# Patient Record
Sex: MALE | Race: WHITE | Employment: FULL TIME | URBAN - METROPOLITAN AREA
[De-identification: names, ages, dates, MRNs, and addresses within clinical notes are randomized per-mention and may not be internally consistent; named-entity substitution may affect disease eponyms.]

---

## 2018-01-02 ENCOUNTER — OFFICE VISIT (OUTPATIENT)
Dept: FAMILY MEDICINE CLINIC | Facility: CLINIC | Age: 33
End: 2018-01-02

## 2018-01-02 VITALS
TEMPERATURE: 98 F | SYSTOLIC BLOOD PRESSURE: 122 MMHG | OXYGEN SATURATION: 99 % | HEART RATE: 68 BPM | WEIGHT: 165 LBS | BODY MASS INDEX: 23.62 KG/M2 | DIASTOLIC BLOOD PRESSURE: 70 MMHG | HEIGHT: 70 IN | RESPIRATION RATE: 22 BRPM

## 2018-01-02 DIAGNOSIS — F90.8 ATTENTION DEFICIT HYPERACTIVITY DISORDER (ADHD), OTHER TYPE: ICD-10-CM

## 2018-01-02 DIAGNOSIS — Z85.528 HISTORY OF WILMS' TUMOR: ICD-10-CM

## 2018-01-02 DIAGNOSIS — Z90.5 HISTORY OF NEPHRECTOMY: ICD-10-CM

## 2018-01-02 DIAGNOSIS — Z00.00 ANNUAL PHYSICAL EXAM: Primary | ICD-10-CM

## 2018-01-02 DIAGNOSIS — R41.3 MEMORY LOSS: ICD-10-CM

## 2018-01-02 PROCEDURE — 90686 IIV4 VACC NO PRSV 0.5 ML IM: CPT | Performed by: FAMILY MEDICINE

## 2018-01-02 PROCEDURE — 99213 OFFICE O/P EST LOW 20 MIN: CPT | Performed by: FAMILY MEDICINE

## 2018-01-02 PROCEDURE — 99395 PREV VISIT EST AGE 18-39: CPT | Performed by: FAMILY MEDICINE

## 2018-01-02 PROCEDURE — 90471 IMMUNIZATION ADMIN: CPT | Performed by: FAMILY MEDICINE

## 2018-01-02 RX ORDER — DEXTROAMPHETAMINE SACCHARATE, AMPHETAMINE ASPARTATE MONOHYDRATE, DEXTROAMPHETAMINE SULFATE AND AMPHETAMINE SULFATE 5; 5; 5; 5 MG/1; MG/1; MG/1; MG/1
20 CAPSULE, EXTENDED RELEASE ORAL EVERY MORNING
Qty: 30 CAPSULE | Refills: 0 | Status: SHIPPED | OUTPATIENT
Start: 2018-01-02 | End: 2018-01-29

## 2018-01-02 NOTE — PROGRESS NOTES
Caryn Taylor is a 28year old male who presents for a complete physical exam.   HPI:   Pt complains of memory loss and urinary urgency. 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 Pt never discussed this with neuro.     Pt reports memory issues denies nasal congestion, sinus pain or ST  LUNGS: denies shortness of breath with exertion  CARDIOVASCULAR: denies chest pain on exertion  GI: denies abdominal pain,denies heartburn  : denies nocturia or changes in stream  MUSCULOSKELETAL: denies back pa capsule (20 mg total) by mouth every morning. Dispense: 30 capsule; Refill: 0      Discussed diet exercise calcium vit d and fish oil. Order for fasting labs. The patient indicates understanding of these issues and agrees to the plan.     RTC in 1 mo or

## 2018-01-03 ENCOUNTER — TELEPHONE (OUTPATIENT)
Dept: FAMILY MEDICINE CLINIC | Facility: CLINIC | Age: 33
End: 2018-01-03

## 2018-01-04 NOTE — TELEPHONE ENCOUNTER
Prior auth done thru CoverMyMeds. Approval received for dates 12/5/17-01/03/21. Pharmacy informed. Paper placed in triage brown file for future use.

## 2018-01-10 ENCOUNTER — LABORATORY ENCOUNTER (OUTPATIENT)
Dept: LAB | Age: 33
End: 2018-01-10
Attending: FAMILY MEDICINE
Payer: COMMERCIAL

## 2018-01-10 ENCOUNTER — OFFICE VISIT (OUTPATIENT)
Dept: NEUROLOGY | Facility: CLINIC | Age: 33
End: 2018-01-10

## 2018-01-10 VITALS
WEIGHT: 165 LBS | SYSTOLIC BLOOD PRESSURE: 110 MMHG | DIASTOLIC BLOOD PRESSURE: 80 MMHG | BODY MASS INDEX: 24 KG/M2 | HEART RATE: 64 BPM

## 2018-01-10 DIAGNOSIS — R41.3 MEMORY LOSS: Primary | ICD-10-CM

## 2018-01-10 DIAGNOSIS — Z00.00 ANNUAL PHYSICAL EXAM: ICD-10-CM

## 2018-01-10 DIAGNOSIS — F39 MOOD DISORDER (HCC): ICD-10-CM

## 2018-01-10 LAB
25-HYDROXYVITAMIN D (TOTAL): 36.7 NG/ML (ref 30–100)
ALBUMIN SERPL-MCNC: 4 G/DL (ref 3.5–4.8)
ALP LIVER SERPL-CCNC: 96 U/L (ref 45–117)
ALT SERPL-CCNC: 33 U/L (ref 17–63)
AST SERPL-CCNC: 23 U/L (ref 15–41)
BASOPHILS # BLD AUTO: 0.05 X10(3) UL (ref 0–0.1)
BASOPHILS NFR BLD AUTO: 0.7 %
BILIRUB SERPL-MCNC: 0.5 MG/DL (ref 0.1–2)
BUN BLD-MCNC: 19 MG/DL (ref 8–20)
CALCIUM BLD-MCNC: 9.3 MG/DL (ref 8.3–10.3)
CHLORIDE: 105 MMOL/L (ref 101–111)
CHOLEST SMN-MCNC: 188 MG/DL (ref ?–200)
CO2: 31 MMOL/L (ref 22–32)
CREAT BLD-MCNC: 1.26 MG/DL (ref 0.7–1.3)
EOSINOPHIL # BLD AUTO: 0.14 X10(3) UL (ref 0–0.3)
EOSINOPHIL NFR BLD AUTO: 2 %
ERYTHROCYTE [DISTWIDTH] IN BLOOD BY AUTOMATED COUNT: 13.1 % (ref 11.5–16)
FREE T4: 1.1 NG/DL (ref 0.9–1.8)
GLUCOSE BLD-MCNC: 84 MG/DL (ref 70–99)
HAV AB SERPL IA-ACNC: 1132 PG/ML (ref 193–986)
HCT VFR BLD AUTO: 44.1 % (ref 37–53)
HDLC SERPL-MCNC: 47 MG/DL (ref 45–?)
HDLC SERPL: 4 {RATIO} (ref ?–4.97)
HGB BLD-MCNC: 14.9 G/DL (ref 13–17)
IMMATURE GRANULOCYTE COUNT: 0.01 X10(3) UL (ref 0–1)
IMMATURE GRANULOCYTE RATIO %: 0.1 %
LDLC SERPL CALC-MCNC: 129 MG/DL (ref ?–130)
LYMPHOCYTES # BLD AUTO: 3.15 X10(3) UL (ref 0.9–4)
LYMPHOCYTES NFR BLD AUTO: 46.1 %
M PROTEIN MFR SERPL ELPH: 7.9 G/DL (ref 6.1–8.3)
MCH RBC QN AUTO: 29 PG (ref 27–33.2)
MCHC RBC AUTO-ENTMCNC: 33.8 G/DL (ref 31–37)
MCV RBC AUTO: 85.8 FL (ref 80–99)
MONOCYTES # BLD AUTO: 0.75 X10(3) UL (ref 0.1–0.6)
MONOCYTES NFR BLD AUTO: 11 %
NEUTROPHIL ABS PRELIM: 2.73 X10 (3) UL (ref 1.3–6.7)
NEUTROPHILS # BLD AUTO: 2.73 X10(3) UL (ref 1.3–6.7)
NEUTROPHILS NFR BLD AUTO: 40.1 %
NONHDLC SERPL-MCNC: 141 MG/DL (ref ?–130)
PLATELET # BLD AUTO: 291 10(3)UL (ref 150–450)
POTASSIUM SERPL-SCNC: 4.9 MMOL/L (ref 3.6–5.1)
RBC # BLD AUTO: 5.14 X10(6)UL (ref 4.3–5.7)
RED CELL DISTRIBUTION WIDTH-SD: 40.6 FL (ref 35.1–46.3)
SODIUM SERPL-SCNC: 140 MMOL/L (ref 136–144)
TRIGL SERPL-MCNC: 58 MG/DL (ref ?–150)
TSI SER-ACNC: 3.73 MIU/ML (ref 0.35–5.5)
VLDLC SERPL CALC-MCNC: 12 MG/DL (ref 5–40)
WBC # BLD AUTO: 6.8 X10(3) UL (ref 4–13)

## 2018-01-10 PROCEDURE — 84439 ASSAY OF FREE THYROXINE: CPT

## 2018-01-10 PROCEDURE — 80053 COMPREHEN METABOLIC PANEL: CPT

## 2018-01-10 PROCEDURE — 80061 LIPID PANEL: CPT

## 2018-01-10 PROCEDURE — 99244 OFF/OP CNSLTJ NEW/EST MOD 40: CPT | Performed by: OTHER

## 2018-01-10 PROCEDURE — 84443 ASSAY THYROID STIM HORMONE: CPT

## 2018-01-10 PROCEDURE — 82607 VITAMIN B-12: CPT

## 2018-01-10 PROCEDURE — 36415 COLL VENOUS BLD VENIPUNCTURE: CPT

## 2018-01-10 PROCEDURE — 82306 VITAMIN D 25 HYDROXY: CPT

## 2018-01-10 PROCEDURE — 85025 COMPLETE CBC W/AUTO DIFF WBC: CPT

## 2018-01-10 NOTE — PATIENT INSTRUCTIONS
Refill policies:    • Allow 2-3 business days for refills; controlled substances may take longer.   • Contact your pharmacy at least 5 days prior to running out of medication and have them send an electronic request or submit request through the [facility] have a procedure or additional testing performed. [facility]) will contact your insurance carrier to obtain pre-certification or prior authorization.     Unfortunately, APRIL has seen an increase in denial of payment even though the p

## 2018-01-10 NOTE — PROGRESS NOTES
[facility] OUTPATIENT NEUROLOGY CONSULTATION    Date of consult: 1/10/2018    CC: memory change    HPI: Constance Davis is a 28year old male with past medical history as listed below presents here for initial evaluation of memory loss for the past 1-2 years, wife Medical History:   Diagnosis Date   • History of Wilms' tumor      Past Surgical History:  No date: LAPAROSCOPY, SURGICAL; NEPHRECTOMY  No date: REPAIR ROTATOR CUFF,ACUTE  5/25/17: VASECTOMY      Comment: Dr. Deep Carballo History:     Smoking status: Never regarding the adverse and side effects of the medications. RTC after tests    Patient was told to contact office if there are any side effects from medication or other concerns.     Zahida Iraheta MD   Neurology  [facility]  1

## 2018-01-22 ENCOUNTER — HOSPITAL ENCOUNTER (OUTPATIENT)
Dept: MRI IMAGING | Facility: HOSPITAL | Age: 33
Discharge: HOME | End: 2018-01-22
Attending: Other
Payer: COMMERCIAL

## 2018-01-22 DIAGNOSIS — R41.3 MEMORY LOSS: ICD-10-CM

## 2018-01-22 DIAGNOSIS — F39 MOOD DISORDER (HCC): ICD-10-CM

## 2018-01-22 PROCEDURE — 70551 MRI BRAIN STEM W/O DYE: CPT | Performed by: OTHER

## 2018-01-23 ENCOUNTER — PATIENT MESSAGE (OUTPATIENT)
Dept: NEUROLOGY | Facility: CLINIC | Age: 33
End: 2018-01-23

## 2018-01-24 NOTE — TELEPHONE ENCOUNTER
From: Nadira Arevalo  To: Haim Jaquez MD  Sent: 1/23/2018 6:07 PM CST  Subject: Test Results Question    I have a question about MRI BRAIN (WITHOUT CONTRAST) (CPT=70551) resulted on 1/22/18 at 5:04 PM.    Could there be anything else that may explain m

## 2018-01-24 NOTE — TELEPHONE ENCOUNTER
These hyperintensities are non specific and commonly seen in different age group.  It can represent many etiologies including small vessel disease, migraine related or demyelinating disease, I recommend a follow up visit to discuss in person thoroughly inst

## 2018-01-29 NOTE — PROGRESS NOTES
Luis A Andrade is a 28year old male who presents for ADD f/u    HPI:     Pt reports memory issues for as long as he can remember.   Pt feels the ADD meds have helped/ wife unsure     Improved concentration   Improved frustration / wife unsure  Less distrac ST  LUNGS: denies shortness of breath with exertion  CARDIOVASCULAR: denies chest pain on exertion  GI: denies abdominal pain,denies heartburn  : denies nocturia or changes in stream  MUSCULOSKELETAL: denies back pain  NEURO: denies headaches  PSYCHE: de

## 2018-02-05 ENCOUNTER — APPOINTMENT (OUTPATIENT)
Dept: LAB | Age: 33
End: 2018-02-05
Attending: FAMILY MEDICINE
Payer: COMMERCIAL

## 2018-02-05 DIAGNOSIS — R93.89 ABNORMAL MRI: ICD-10-CM

## 2018-02-05 LAB
ANA SCREEN: NEGATIVE
C-REACTIVE PROTEIN: <0.29 MG/DL (ref ?–1)
RHEUMATOID FACT SERPL-ACNC: <10 IU/ML (ref ?–15)
SED RATE-ML: 5 MM/HR (ref 0–12)

## 2018-02-05 PROCEDURE — 36415 COLL VENOUS BLD VENIPUNCTURE: CPT | Performed by: FAMILY MEDICINE

## 2018-02-05 PROCEDURE — 86617 LYME DISEASE ANTIBODY: CPT | Performed by: FAMILY MEDICINE

## 2018-02-05 PROCEDURE — 86431 RHEUMATOID FACTOR QUANT: CPT | Performed by: FAMILY MEDICINE

## 2018-02-05 PROCEDURE — 85652 RBC SED RATE AUTOMATED: CPT | Performed by: FAMILY MEDICINE

## 2018-02-05 PROCEDURE — 86140 C-REACTIVE PROTEIN: CPT | Performed by: FAMILY MEDICINE

## 2018-02-05 PROCEDURE — 86038 ANTINUCLEAR ANTIBODIES: CPT | Performed by: FAMILY MEDICINE

## 2018-02-08 LAB
B. BURGDORFERI AB, IGM BY WB: POSITIVE
B. BURGDORFERI, IGG WB: NEGATIVE

## 2018-02-09 ENCOUNTER — NURSE ONLY (OUTPATIENT)
Dept: NEUROLOGY | Facility: CLINIC | Age: 33
End: 2018-02-09

## 2018-02-09 DIAGNOSIS — F39 MOOD DISORDER (HCC): ICD-10-CM

## 2018-02-09 DIAGNOSIS — F41.9 ANXIETY: ICD-10-CM

## 2018-02-09 DIAGNOSIS — F90.8 ATTENTION DEFICIT HYPERACTIVITY DISORDER (ADHD), OTHER TYPE: ICD-10-CM

## 2018-02-09 DIAGNOSIS — R41.3 MEMORY LOSS: ICD-10-CM

## 2018-02-09 PROCEDURE — 95816 EEG AWAKE AND DROWSY: CPT | Performed by: OTHER

## 2018-02-09 NOTE — PROCEDURES
Date of Procedure: 2/9/2018    Procedure: EEG (ELECTROENCEPHALOGRAM)     DX:  MEMORY LOSS, DIFFICULTY FOCUSING/ CONCENTRATING  HX:  PT IS A 34 Y/O MALE PRESENT FOR EVALUATION OF MEMORY LOSS FOR THE PAST FEW YEARS.   PER REPORT PT HAS TROUBLE ITH SIMPLE 1ST

## 2018-02-20 ENCOUNTER — TELEPHONE (OUTPATIENT)
Dept: FAMILY MEDICINE CLINIC | Facility: CLINIC | Age: 33
End: 2018-02-20

## 2018-02-26 ENCOUNTER — TELEPHONE (OUTPATIENT)
Dept: NEUROLOGY | Facility: CLINIC | Age: 33
End: 2018-02-26

## 2018-02-26 NOTE — TELEPHONE ENCOUNTER
Pt calling for EEG results for test done on 2/9/18. Per EEG report:    IMPRESSION: This is a normal awake and drowsy EEG. However, this does not rule out seizure disorder. Clinical correlation is advised.     Will route to Dr. Tate Brooks for any further recom

## 2018-03-06 ENCOUNTER — TELEPHONE (OUTPATIENT)
Dept: NEUROLOGY | Facility: CLINIC | Age: 33
End: 2018-03-06

## 2018-03-07 DIAGNOSIS — F90.8 ATTENTION DEFICIT HYPERACTIVITY DISORDER (ADHD), OTHER TYPE: ICD-10-CM

## 2018-03-07 NOTE — TELEPHONE ENCOUNTER
Left message for patient- Dr. Annette Dunlap wanted to see him back in a month. Unable to leave message- mailbox full.

## 2018-03-07 NOTE — TELEPHONE ENCOUNTER
Patient called and stated pharmacy told him he needed a PA for his Adderall. An encounter on 1-3-18 shows approval for the drug is complete from 12-+5-17 to 1-3-2021    Can we refill his scripts. He will be the one picking up with ID    Please advise.

## 2018-03-08 RX ORDER — DEXTROAMPHETAMINE SACCHARATE, AMPHETAMINE ASPARTATE MONOHYDRATE, DEXTROAMPHETAMINE SULFATE AND AMPHETAMINE SULFATE 6.25; 6.25; 6.25; 6.25 MG/1; MG/1; MG/1; MG/1
25 CAPSULE, EXTENDED RELEASE ORAL EVERY MORNING
Qty: 30 CAPSULE | Refills: 0 | Status: SHIPPED | OUTPATIENT
Start: 2018-03-08 | End: 2019-02-11

## 2018-04-03 ENCOUNTER — APPOINTMENT (OUTPATIENT)
Dept: LAB | Age: 33
End: 2018-04-03
Attending: FAMILY MEDICINE
Payer: COMMERCIAL

## 2018-04-03 ENCOUNTER — OFFICE VISIT (OUTPATIENT)
Dept: FAMILY MEDICINE CLINIC | Facility: CLINIC | Age: 33
End: 2018-04-03

## 2018-04-03 VITALS
BODY MASS INDEX: 23.19 KG/M2 | TEMPERATURE: 99 F | HEIGHT: 70 IN | WEIGHT: 162 LBS | RESPIRATION RATE: 20 BRPM | SYSTOLIC BLOOD PRESSURE: 126 MMHG | DIASTOLIC BLOOD PRESSURE: 78 MMHG | OXYGEN SATURATION: 98 % | HEART RATE: 72 BPM

## 2018-04-03 DIAGNOSIS — F98.8 ATTENTION DEFICIT DISORDER (ADD) WITHOUT HYPERACTIVITY: ICD-10-CM

## 2018-04-03 DIAGNOSIS — R90.89 ABNORMAL BRAIN MRI: ICD-10-CM

## 2018-04-03 DIAGNOSIS — R90.89 ABNORMAL BRAIN MRI: Primary | ICD-10-CM

## 2018-04-03 PROCEDURE — 36415 COLL VENOUS BLD VENIPUNCTURE: CPT | Performed by: FAMILY MEDICINE

## 2018-04-03 PROCEDURE — 99214 OFFICE O/P EST MOD 30 MIN: CPT | Performed by: FAMILY MEDICINE

## 2018-04-03 PROCEDURE — 86617 LYME DISEASE ANTIBODY: CPT | Performed by: FAMILY MEDICINE

## 2018-04-03 PROCEDURE — 87476 LYME DIS DNA AMP PROBE: CPT | Performed by: FAMILY MEDICINE

## 2018-04-03 RX ORDER — DEXTROAMPHETAMINE SACCHARATE, AMPHETAMINE ASPARTATE MONOHYDRATE, DEXTROAMPHETAMINE SULFATE AND AMPHETAMINE SULFATE 6.25; 6.25; 6.25; 6.25 MG/1; MG/1; MG/1; MG/1
25 CAPSULE, EXTENDED RELEASE ORAL DAILY
Qty: 30 CAPSULE | Refills: 0 | Status: SHIPPED | OUTPATIENT
Start: 2018-05-03 | End: 2018-04-09

## 2018-04-03 RX ORDER — DEXTROAMPHETAMINE SACCHARATE, AMPHETAMINE ASPARTATE MONOHYDRATE, DEXTROAMPHETAMINE SULFATE AND AMPHETAMINE SULFATE 6.25; 6.25; 6.25; 6.25 MG/1; MG/1; MG/1; MG/1
25 CAPSULE, EXTENDED RELEASE ORAL DAILY
Qty: 30 CAPSULE | Refills: 0 | Status: SHIPPED | OUTPATIENT
Start: 2018-06-02 | End: 2018-04-09

## 2018-04-03 RX ORDER — DEXTROAMPHETAMINE SACCHARATE, AMPHETAMINE ASPARTATE MONOHYDRATE, DEXTROAMPHETAMINE SULFATE AND AMPHETAMINE SULFATE 6.25; 6.25; 6.25; 6.25 MG/1; MG/1; MG/1; MG/1
25 CAPSULE, EXTENDED RELEASE ORAL DAILY
Qty: 30 CAPSULE | Refills: 0 | Status: SHIPPED | OUTPATIENT
Start: 2018-04-03 | End: 2018-04-09

## 2018-04-03 NOTE — PROGRESS NOTES
Charissa Cabezas is a 35year old male who presents for ADD    HPI:     Pt reports memory issues for as long as he can remember.   Pt feels the ADD meds have helped; the higher dose (25mg) has been better    Improved concentration   Improved frustration / wif heartburn  : denies nocturia or changes in stream  MUSCULOSKELETAL: denies back pain  NEURO: denies headaches  PSYCHE: denies depression or anxiety  HEMATOLOGIC: denies hx of anemia  ENDOCRINE: denies thyroid history  ALL/ASTHMA: denies hx of allergy or

## 2018-04-06 ENCOUNTER — OFFICE VISIT (OUTPATIENT)
Dept: NEUROLOGY | Facility: CLINIC | Age: 33
End: 2018-04-06

## 2018-04-06 VITALS
WEIGHT: 161 LBS | BODY MASS INDEX: 23 KG/M2 | DIASTOLIC BLOOD PRESSURE: 80 MMHG | SYSTOLIC BLOOD PRESSURE: 122 MMHG | RESPIRATION RATE: 16 BRPM | HEART RATE: 76 BPM

## 2018-04-06 DIAGNOSIS — A69.20 LYME DISEASE: Primary | ICD-10-CM

## 2018-04-06 PROCEDURE — 99215 OFFICE O/P EST HI 40 MIN: CPT | Performed by: OTHER

## 2018-04-06 NOTE — PATIENT INSTRUCTIONS
Refill policies:    • Allow 2-3 business days for refills; controlled substances may take longer.   • Contact your pharmacy at least 5 days prior to running out of medication and have them send an electronic request or submit request through the [facility] for the entire amount billed. Precertification and Prior Authorizations  If your physician has recommended that you have a procedure or additional testing performed.   [facility] ([facility]) will contact your insurance carrier to obtain pr

## 2018-04-06 NOTE — PROGRESS NOTES
[facility] Neurology outpatient progress note  Date of service: 4/6/2018    Patient here for a follow-up visit for memory change. Since last visit he seems improved, pt and wife are happy with it. Here to review MRI, neuropsych test and EEG, labs.   Karina Russ •  Amphetamine-Dextroamphet ER (ADDERALL XR) 25 MG Oral Capsule SR 24 Hr, Take 1 capsule (25 mg total) by mouth daily. , Disp: 30 capsule, Rfl: 0  •  [START ON 5/3/2018] Amphetamine-Dextroamphet ER (ADDERALL XR) 25 MG Oral Capsule SR 24 Hr, Take 1 capsule ( Tone: normal  DTRs: 2+ symmetric  Plantar response: bilateral flexor  Coordination: Normal FTN  Sensory: symmetric to PP and LT  Gait: nl  Romberg: nl  Neck: supple    Test reviewed on 4/6/2018    A/P:   (R41.3) Memory loss  (primary encounter diagnosis):

## 2018-04-09 ENCOUNTER — OFFICE VISIT (OUTPATIENT)
Dept: FAMILY MEDICINE CLINIC | Facility: CLINIC | Age: 33
End: 2018-04-09

## 2018-04-09 VITALS
SYSTOLIC BLOOD PRESSURE: 118 MMHG | HEART RATE: 90 BPM | BODY MASS INDEX: 22.9 KG/M2 | WEIGHT: 160 LBS | HEIGHT: 70 IN | RESPIRATION RATE: 16 BRPM | DIASTOLIC BLOOD PRESSURE: 84 MMHG | TEMPERATURE: 99 F

## 2018-04-09 DIAGNOSIS — L50.9 URTICARIA: Primary | ICD-10-CM

## 2018-04-09 DIAGNOSIS — R76.8 POSITIVE LYME DISEASE SEROLOGY: ICD-10-CM

## 2018-04-09 PROCEDURE — 99214 OFFICE O/P EST MOD 30 MIN: CPT | Performed by: PHYSICIAN ASSISTANT

## 2018-04-09 RX ORDER — PREDNISONE 20 MG/1
TABLET ORAL
Qty: 10 TABLET | Refills: 0 | Status: SHIPPED | OUTPATIENT
Start: 2018-04-09 | End: 2018-04-11 | Stop reason: ALTCHOICE

## 2018-04-09 RX ORDER — DOXYCYCLINE HYCLATE 100 MG
100 TABLET ORAL 2 TIMES DAILY
Qty: 56 TABLET | Refills: 0 | Status: SHIPPED | OUTPATIENT
Start: 2018-04-09 | End: 2018-04-11 | Stop reason: ALTCHOICE

## 2018-04-09 NOTE — PROGRESS NOTES
HPI:    Patient ID: Yara Cuevas is a 35year old male. HPI  Pt presents to clinic with rash. Pt recently diagnosed with lyme disease. Had labs in February and April.  Had MRI which showed \"CONCLUSION: There are few scattered T2/FLAIR hyperintensit Disp:  Rfl:      Allergies:No Known Allergies   PHYSICAL EXAM:   Physical Exam   Constitutional: He appears well-developed and well-nourished. No distress. HENT:   Head: Normocephalic and atraumatic.    Right Ear: External ear normal.   Left Ear: External worsens or spreads or if chest pain or SOB occur. 30 minutes spent with the patient, greater than 50% of the time spent counseling and coordinating care. Pt's questions answered to satisfaction. He verbalized understanding and is in agreement with plan.

## 2018-04-11 ENCOUNTER — OFFICE VISIT (OUTPATIENT)
Dept: FAMILY MEDICINE CLINIC | Facility: CLINIC | Age: 33
End: 2018-04-11

## 2018-04-11 VITALS
SYSTOLIC BLOOD PRESSURE: 138 MMHG | HEART RATE: 85 BPM | HEIGHT: 70 IN | BODY MASS INDEX: 22.62 KG/M2 | DIASTOLIC BLOOD PRESSURE: 80 MMHG | RESPIRATION RATE: 16 BRPM | TEMPERATURE: 99 F | OXYGEN SATURATION: 97 % | WEIGHT: 158 LBS

## 2018-04-11 DIAGNOSIS — L50.9 URTICARIA: Primary | ICD-10-CM

## 2018-04-11 DIAGNOSIS — R76.8 POSITIVE LYME DISEASE SEROLOGY: ICD-10-CM

## 2018-04-11 PROCEDURE — 99213 OFFICE O/P EST LOW 20 MIN: CPT | Performed by: PHYSICIAN ASSISTANT

## 2018-04-11 NOTE — PROGRESS NOTES
HPI:    Patient ID: Christina Parmar is a 35year old male. HPI  Pt presents to clinic to follow up on rash. Seen two days ago. Was prescribed prednisone but did not take. He took benadryl and his rash resolved. He also did not start the doxycycline.  D Pt states he has follow up with Dr Mk Pack in a couple of months. Follow up as needed  Pt's questions answered to satisfaction. He verbalized understanding and is in agreement with plan.         LEA#[id number]

## 2018-04-17 ENCOUNTER — TELEPHONE (OUTPATIENT)
Dept: FAMILY MEDICINE CLINIC | Facility: CLINIC | Age: 33
End: 2018-04-17

## 2018-04-18 ENCOUNTER — TELEPHONE (OUTPATIENT)
Dept: FAMILY MEDICINE CLINIC | Facility: CLINIC | Age: 33
End: 2018-04-18

## 2018-04-18 NOTE — TELEPHONE ENCOUNTER
Patient states his rash is back. He wants to know if he should start the steroid or what the next step is.

## 2018-04-18 NOTE — TELEPHONE ENCOUNTER
Patient has been in twice to see us for a rash. The rash was down patients sides but now is all over the front of him. Rash is still really itchy. What should he do.

## 2018-04-18 NOTE — TELEPHONE ENCOUNTER
Spoke to pt states he has been seen a couple times for rash pt states he did not  prednisone from pharmacy which was prescribed at 4/9/18 visit. pt states he also has not taken Benadryl.  Pt states rash is not worse it just keeps coming back and is it

## 2018-04-23 ENCOUNTER — LAB ENCOUNTER (OUTPATIENT)
Dept: LAB | Age: 33
End: 2018-04-23
Attending: PHYSICIAN ASSISTANT
Payer: COMMERCIAL

## 2018-04-23 ENCOUNTER — OFFICE VISIT (OUTPATIENT)
Dept: FAMILY MEDICINE CLINIC | Facility: CLINIC | Age: 33
End: 2018-04-23

## 2018-04-23 VITALS
HEART RATE: 68 BPM | TEMPERATURE: 97 F | HEIGHT: 70 IN | OXYGEN SATURATION: 98 % | BODY MASS INDEX: 22.62 KG/M2 | DIASTOLIC BLOOD PRESSURE: 72 MMHG | RESPIRATION RATE: 16 BRPM | WEIGHT: 158 LBS | SYSTOLIC BLOOD PRESSURE: 118 MMHG

## 2018-04-23 DIAGNOSIS — L50.9 URTICARIA: Primary | ICD-10-CM

## 2018-04-23 DIAGNOSIS — L50.9 URTICARIA: ICD-10-CM

## 2018-04-23 PROCEDURE — 86003 ALLG SPEC IGE CRUDE XTRC EA: CPT | Performed by: PHYSICIAN ASSISTANT

## 2018-04-23 PROCEDURE — 85652 RBC SED RATE AUTOMATED: CPT | Performed by: PHYSICIAN ASSISTANT

## 2018-04-23 PROCEDURE — 80053 COMPREHEN METABOLIC PANEL: CPT | Performed by: PHYSICIAN ASSISTANT

## 2018-04-23 PROCEDURE — 82785 ASSAY OF IGE: CPT | Performed by: PHYSICIAN ASSISTANT

## 2018-04-23 PROCEDURE — 86140 C-REACTIVE PROTEIN: CPT | Performed by: PHYSICIAN ASSISTANT

## 2018-04-23 PROCEDURE — 36415 COLL VENOUS BLD VENIPUNCTURE: CPT | Performed by: PHYSICIAN ASSISTANT

## 2018-04-23 PROCEDURE — 85025 COMPLETE CBC W/AUTO DIFF WBC: CPT | Performed by: PHYSICIAN ASSISTANT

## 2018-04-23 PROCEDURE — 99213 OFFICE O/P EST LOW 20 MIN: CPT | Performed by: PHYSICIAN ASSISTANT

## 2018-04-23 NOTE — PROGRESS NOTES
HPI:    Patient ID: Jana Serrato is a 35year old male. HPI  Pt presents to clinic to follow up on rash. Initially seen on 4/9/18. He took only benadryl at that time and the rash had resolved at his follow up on 4/11/18.  Rash returned on 4/17/18 an clear and moist.   Airway patent   Neck: Neck supple. No tracheal deviation present. Cardiovascular: Normal rate, regular rhythm and normal heart sounds. Pulmonary/Chest: Effort normal and breath sounds normal. He has no wheezes.    Musculoskeletal: No

## 2018-04-26 DIAGNOSIS — E87.6 LOW BLOOD POTASSIUM: Primary | ICD-10-CM

## 2018-04-27 ENCOUNTER — APPOINTMENT (OUTPATIENT)
Dept: LAB | Age: 33
End: 2018-04-27
Attending: PHYSICIAN ASSISTANT
Payer: COMMERCIAL

## 2018-04-27 DIAGNOSIS — E87.6 LOW BLOOD POTASSIUM: ICD-10-CM

## 2018-04-27 PROCEDURE — 36415 COLL VENOUS BLD VENIPUNCTURE: CPT | Performed by: PHYSICIAN ASSISTANT

## 2018-04-27 PROCEDURE — 84132 ASSAY OF SERUM POTASSIUM: CPT | Performed by: PHYSICIAN ASSISTANT

## 2018-07-13 ENCOUNTER — OFFICE VISIT (OUTPATIENT)
Dept: FAMILY MEDICINE CLINIC | Facility: CLINIC | Age: 33
End: 2018-07-13

## 2018-07-13 VITALS
DIASTOLIC BLOOD PRESSURE: 70 MMHG | HEART RATE: 76 BPM | RESPIRATION RATE: 16 BRPM | SYSTOLIC BLOOD PRESSURE: 116 MMHG | WEIGHT: 151 LBS | BODY MASS INDEX: 22 KG/M2

## 2018-07-13 DIAGNOSIS — F90.8 ATTENTION DEFICIT HYPERACTIVITY DISORDER (ADHD), OTHER TYPE: ICD-10-CM

## 2018-07-13 PROCEDURE — 99214 OFFICE O/P EST MOD 30 MIN: CPT | Performed by: FAMILY MEDICINE

## 2018-07-13 RX ORDER — DEXTROAMPHETAMINE SACCHARATE, AMPHETAMINE ASPARTATE MONOHYDRATE, DEXTROAMPHETAMINE SULFATE AND AMPHETAMINE SULFATE 6.25; 6.25; 6.25; 6.25 MG/1; MG/1; MG/1; MG/1
25 CAPSULE, EXTENDED RELEASE ORAL DAILY
Qty: 30 CAPSULE | Refills: 0 | Status: SHIPPED | OUTPATIENT
Start: 2018-07-18 | End: 2018-08-17

## 2018-07-13 RX ORDER — DEXTROAMPHETAMINE SACCHARATE, AMPHETAMINE ASPARTATE MONOHYDRATE, DEXTROAMPHETAMINE SULFATE AND AMPHETAMINE SULFATE 6.25; 6.25; 6.25; 6.25 MG/1; MG/1; MG/1; MG/1
25 CAPSULE, EXTENDED RELEASE ORAL DAILY
Qty: 30 CAPSULE | Refills: 0 | Status: SHIPPED | OUTPATIENT
Start: 2018-08-17 | End: 2018-09-16

## 2018-07-13 RX ORDER — DEXTROAMPHETAMINE SACCHARATE, AMPHETAMINE ASPARTATE MONOHYDRATE, DEXTROAMPHETAMINE SULFATE AND AMPHETAMINE SULFATE 6.25; 6.25; 6.25; 6.25 MG/1; MG/1; MG/1; MG/1
25 CAPSULE, EXTENDED RELEASE ORAL DAILY
Qty: 30 CAPSULE | Refills: 0 | Status: SHIPPED | OUTPATIENT
Start: 2018-09-16 | End: 2018-10-16

## 2018-07-13 NOTE — PROGRESS NOTES
Angi Barnes is a 35year old male who presents for ADD f/u    HPI:     Pt reports memory issues for as long as he can remember.   Pt feels the ADD meds have helped/ wife unsure     Improved concentration   No frustration   Less distracted and forgetfu shortness of breath with exertion  CARDIOVASCULAR: denies chest pain on exertion  GI: denies abdominal pain,denies heartburn  : denies nocturia or changes in stream  MUSCULOSKELETAL: denies back pain  NEURO: denies headaches  PSYCHE: denies depression or Pt denies hopelessness or anhedonia. Pt reports normal sleep and normal appetite. Pt has been seeing neuro   MRI with some non specific changes.   Discussed EEG and labs       Current Outpatient Prescriptions:  [START ON 7/18/2018] Amphetamine-Dextro in stream  MUSCULOSKELETAL: denies back pain  NEURO: denies headaches  PSYCHE: denies depression or anxiety  HEMATOLOGIC: denies hx of anemia  ENDOCRINE: denies thyroid history  ALL/ASTHMA: denies hx of allergy or asthma    EXAM:   /70   Pulse 76   R
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
## 2019-01-07 ENCOUNTER — OFFICE VISIT (OUTPATIENT)
Dept: FAMILY MEDICINE CLINIC | Facility: CLINIC | Age: 34
End: 2019-01-07
Payer: COMMERCIAL

## 2019-01-07 VITALS
HEART RATE: 84 BPM | HEIGHT: 68 IN | RESPIRATION RATE: 18 BRPM | BODY MASS INDEX: 23.27 KG/M2 | SYSTOLIC BLOOD PRESSURE: 118 MMHG | OXYGEN SATURATION: 98 % | TEMPERATURE: 98 F | WEIGHT: 153.5 LBS | DIASTOLIC BLOOD PRESSURE: 80 MMHG

## 2019-01-07 DIAGNOSIS — Z23 NEED FOR VACCINATION: ICD-10-CM

## 2019-01-07 DIAGNOSIS — R45.89 DEPRESSED MOOD: ICD-10-CM

## 2019-01-07 DIAGNOSIS — Z00.00 ANNUAL PHYSICAL EXAM: Primary | ICD-10-CM

## 2019-01-07 DIAGNOSIS — R41.3 MEMORY LOSS: ICD-10-CM

## 2019-01-07 PROCEDURE — 99395 PREV VISIT EST AGE 18-39: CPT | Performed by: FAMILY MEDICINE

## 2019-01-07 PROCEDURE — 90471 IMMUNIZATION ADMIN: CPT | Performed by: FAMILY MEDICINE

## 2019-01-07 PROCEDURE — 90686 IIV4 VACC NO PRSV 0.5 ML IM: CPT | Performed by: FAMILY MEDICINE

## 2019-01-07 NOTE — PROGRESS NOTES
El Shown is a 35year old male who presents for a complete physical exam.   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

## 2019-02-11 DIAGNOSIS — F90.8 ATTENTION DEFICIT HYPERACTIVITY DISORDER (ADHD), OTHER TYPE: ICD-10-CM

## 2019-02-12 RX ORDER — DEXTROAMPHETAMINE SACCHARATE, AMPHETAMINE ASPARTATE MONOHYDRATE, DEXTROAMPHETAMINE SULFATE AND AMPHETAMINE SULFATE 6.25; 6.25; 6.25; 6.25 MG/1; MG/1; MG/1; MG/1
25 CAPSULE, EXTENDED RELEASE ORAL EVERY MORNING
Qty: 30 CAPSULE | Refills: 0 | Status: SHIPPED | OUTPATIENT
Start: 2019-02-12 | End: 2020-10-12

## 2019-02-28 ENCOUNTER — PATIENT MESSAGE (OUTPATIENT)
Dept: FAMILY MEDICINE CLINIC | Facility: CLINIC | Age: 34
End: 2019-02-28

## 2019-02-28 NOTE — TELEPHONE ENCOUNTER
From: Le Bustillos  To: Steffanie Waller DO  Sent: 2/28/2019 8:32 AM CST  Subject: Other    Hello,     I am currently seeing a therapist and his name is Kenna Villalta and he said I should be on anti depressants like Wellbutrin and that's what I made my next appo

## 2019-03-18 NOTE — PROGRESS NOTES
Faina Schilling is a 29year old male who presents for depression     HPI:     Pt feels the ADD meds have helped- pt takes the meds every day     Pt is in couples therapy   Therapist and wife feel he needs medication   + family h/o mood issues  Pt is not congestion, sinus pain or ST  LUNGS: denies shortness of breath with exertion  CARDIOVASCULAR: denies chest pain on exertion  GI: denies abdominal pain,denies heartburn  : denies nocturia or changes in stream  MUSCULOSKELETAL: denies back pain  NEURO: de

## 2019-03-27 RX ORDER — DEXTROAMPHETAMINE SACCHARATE, AMPHETAMINE ASPARTATE MONOHYDRATE, DEXTROAMPHETAMINE SULFATE AND AMPHETAMINE SULFATE 6.25; 6.25; 6.25; 6.25 MG/1; MG/1; MG/1; MG/1
25 CAPSULE, EXTENDED RELEASE ORAL DAILY
Qty: 30 CAPSULE | Refills: 0 | Status: SHIPPED | OUTPATIENT
Start: 2019-05-26 | End: 2019-06-23

## 2019-03-27 RX ORDER — DEXTROAMPHETAMINE SACCHARATE, AMPHETAMINE ASPARTATE MONOHYDRATE, DEXTROAMPHETAMINE SULFATE AND AMPHETAMINE SULFATE 6.25; 6.25; 6.25; 6.25 MG/1; MG/1; MG/1; MG/1
25 CAPSULE, EXTENDED RELEASE ORAL DAILY
Qty: 30 CAPSULE | Refills: 0 | Status: SHIPPED | OUTPATIENT
Start: 2019-03-27 | End: 2019-04-26

## 2019-03-27 RX ORDER — DEXTROAMPHETAMINE SACCHARATE, AMPHETAMINE ASPARTATE MONOHYDRATE, DEXTROAMPHETAMINE SULFATE AND AMPHETAMINE SULFATE 6.25; 6.25; 6.25; 6.25 MG/1; MG/1; MG/1; MG/1
25 CAPSULE, EXTENDED RELEASE ORAL DAILY
Qty: 30 CAPSULE | Refills: 0 | Status: SHIPPED | OUTPATIENT
Start: 2019-04-26 | End: 2019-05-26

## 2019-03-27 NOTE — TELEPHONE ENCOUNTER
Pt thought he had more refills of Adderall. He did not when he went to go fill it. He is completely out so needs medication asap so he does not go without. Guadalupe Riedel [facility] [place] 3 month supply.  Wife Lio Pac will be picking up once

## 2019-04-16 NOTE — PROGRESS NOTES
Yara Cuevas is a 29year old male who presents for depression and ADD  HPI:     Pt feels the ADD meds have helped- pt takes the meds every day and doing well   Pt feels the lexapro is helping; he is less irritable.      Pt is in couples therapy   + fa Disp:  Rfl:    Oxybutynin Chloride ER 5 MG Oral Tablet 24 Hr Take 5 mg by mouth daily.  Disp:  Rfl:       Past Medical History:   Diagnosis Date   • History of Wilms' tumor       Past Surgical History:   Procedure Laterality Date   • LAPAROSCOPY, SURGICAL; plan.  RTC 1 mo or sooner if needed

## 2019-04-22 ENCOUNTER — TELEPHONE (OUTPATIENT)
Dept: FAMILY MEDICINE CLINIC | Facility: CLINIC | Age: 34
End: 2019-04-22

## 2019-04-22 NOTE — TELEPHONE ENCOUNTER
Pt and Dr Lorna Mario Discussed a referral for a Dr That could diagnosis and deals with autism.  Dr Lorna Mario was going to was going to discuss with a co-worker about a referral. This pt is calling to check on the referral.

## 2019-04-23 NOTE — TELEPHONE ENCOUNTER
Reached out to patient and left message requesting callback to discuss options for neuropsych testing to rule out autism spectrum disorder in an adult male.     Received callback and recommended the following:    Earl Irizarry PsyD, [address]

## 2019-05-24 ENCOUNTER — OFFICE VISIT (OUTPATIENT)
Dept: FAMILY MEDICINE CLINIC | Facility: CLINIC | Age: 34
End: 2019-05-24
Payer: COMMERCIAL

## 2019-05-24 VITALS
BODY MASS INDEX: 23.04 KG/M2 | WEIGHT: 152 LBS | HEIGHT: 68 IN | DIASTOLIC BLOOD PRESSURE: 80 MMHG | HEART RATE: 83 BPM | TEMPERATURE: 98 F | OXYGEN SATURATION: 97 % | RESPIRATION RATE: 16 BRPM | SYSTOLIC BLOOD PRESSURE: 126 MMHG

## 2019-05-24 DIAGNOSIS — F32.A ANXIETY AND DEPRESSION: ICD-10-CM

## 2019-05-24 DIAGNOSIS — F41.9 ANXIETY AND DEPRESSION: ICD-10-CM

## 2019-05-24 PROCEDURE — 99214 OFFICE O/P EST MOD 30 MIN: CPT | Performed by: FAMILY MEDICINE

## 2019-05-24 RX ORDER — ESCITALOPRAM OXALATE 20 MG/1
20 TABLET ORAL DAILY
Qty: 90 TABLET | Refills: 0 | Status: SHIPPED | OUTPATIENT
Start: 2019-05-24 | End: 2019-07-08

## 2019-05-24 NOTE — PROGRESS NOTES
Britt Mooney is a 29year old male who presents for depression and ADD  HPI:     Pt feels the ADD meds have helped- pt takes the meds every day and doing well   Pt feels the lexapro is helping, the higher dose is better.   Pt feels more positive     Pt VASECTOMY  5/25/17    Dr. Madiha Alfredo       No family history on file. Social History:  Social History    Tobacco Use      Smoking status: Never Smoker      Smokeless tobacco: Never Used    Alcohol use: Yes      Comment: occassional    Drug use: No     Occ:.  [facility]

## 2019-06-24 RX ORDER — DEXTROAMPHETAMINE SACCHARATE, AMPHETAMINE ASPARTATE MONOHYDRATE, DEXTROAMPHETAMINE SULFATE AND AMPHETAMINE SULFATE 6.25; 6.25; 6.25; 6.25 MG/1; MG/1; MG/1; MG/1
25 CAPSULE, EXTENDED RELEASE ORAL DAILY
Qty: 30 CAPSULE | Refills: 0 | Status: SHIPPED | OUTPATIENT
Start: 2019-06-24 | End: 2019-07-24

## 2019-07-09 NOTE — PROGRESS NOTES
Yara Cuevas is a 29year old male who presents for depression and ADD  HPI:     Pt feels the ADD meds have helped- pt takes the meds every day and doing well   Pt feels the lexapro is helping  Pt feels more positive     Pt is in couples therapy, stil blurred vision or double vision  HEENT: denies nasal congestion, sinus pain or ST  LUNGS: denies shortness of breath with exertion  CARDIOVASCULAR: denies chest pain on exertion  GI: denies abdominal pain,denies heartburn  : denies nocturia or changes in

## 2019-10-03 DIAGNOSIS — F90.8 ATTENTION DEFICIT HYPERACTIVITY DISORDER (ADHD), OTHER TYPE: ICD-10-CM

## 2019-10-04 RX ORDER — DEXTROAMPHETAMINE SACCHARATE, AMPHETAMINE ASPARTATE MONOHYDRATE, DEXTROAMPHETAMINE SULFATE AND AMPHETAMINE SULFATE 6.25; 6.25; 6.25; 6.25 MG/1; MG/1; MG/1; MG/1
25 CAPSULE, EXTENDED RELEASE ORAL DAILY
Qty: 30 CAPSULE | Refills: 0 | Status: SHIPPED | OUTPATIENT
Start: 2019-10-04 | End: 2019-11-03

## 2019-10-04 NOTE — TELEPHONE ENCOUNTER
Rx Request  Amphetamine-Dextroamphet ER (ADDERALL XR) 25 MG Oral Capsule SR 24 Hr    Disp:      30              R: 0    Associated Dx: ADHD    Last Visit: 07/08/2019    Last Refilled: 09/06/2019

## 2019-10-09 NOTE — PROGRESS NOTES
Kenrick Ruiz is a 29year old male who presents for depression and ADD  HPI:     Pt feels the ADD meds working well  Pt feels the Lum Crape is helping  Pt feels more positive     Pt is in couples therapy, still working on things.  Things slowly improving blurred vision or double vision  HEENT: denies nasal congestion, sinus pain or ST  LUNGS: denies shortness of breath with exertion  CARDIOVASCULAR: denies chest pain on exertion  GI: denies abdominal pain,denies heartburn  : denies nocturia or changes in

## 2019-10-19 DIAGNOSIS — R39.15 URINARY URGENCY: ICD-10-CM

## 2019-10-21 RX ORDER — OXYBUTYNIN CHLORIDE 10 MG/1
TABLET, EXTENDED RELEASE ORAL
Qty: 90 TABLET | Refills: 0 | Status: SHIPPED | OUTPATIENT
Start: 2019-10-21 | End: 2020-03-09

## 2020-01-24 ENCOUNTER — TELEPHONE (OUTPATIENT)
Dept: FAMILY MEDICINE CLINIC | Facility: CLINIC | Age: 35
End: 2020-01-24

## 2020-01-24 RX ORDER — DEXTROAMPHETAMINE SACCHARATE, AMPHETAMINE ASPARTATE MONOHYDRATE, DEXTROAMPHETAMINE SULFATE AND AMPHETAMINE SULFATE 6.25; 6.25; 6.25; 6.25 MG/1; MG/1; MG/1; MG/1
25 CAPSULE, EXTENDED RELEASE ORAL DAILY
Qty: 30 CAPSULE | Refills: 0 | Status: SHIPPED | OUTPATIENT
Start: 2020-03-26 | End: 2020-04-25

## 2020-01-24 RX ORDER — DEXTROAMPHETAMINE SACCHARATE, AMPHETAMINE ASPARTATE MONOHYDRATE, DEXTROAMPHETAMINE SULFATE AND AMPHETAMINE SULFATE 6.25; 6.25; 6.25; 6.25 MG/1; MG/1; MG/1; MG/1
25 CAPSULE, EXTENDED RELEASE ORAL DAILY
Qty: 30 CAPSULE | Refills: 0 | Status: SHIPPED | OUTPATIENT
Start: 2020-01-24 | End: 2020-02-23

## 2020-01-24 RX ORDER — DEXTROAMPHETAMINE SACCHARATE, AMPHETAMINE ASPARTATE MONOHYDRATE, DEXTROAMPHETAMINE SULFATE AND AMPHETAMINE SULFATE 6.25; 6.25; 6.25; 6.25 MG/1; MG/1; MG/1; MG/1
25 CAPSULE, EXTENDED RELEASE ORAL DAILY
Qty: 30 CAPSULE | Refills: 0 | Status: SHIPPED | OUTPATIENT
Start: 2020-02-24 | End: 2020-03-26

## 2020-02-21 RX ORDER — DEXTROAMPHETAMINE SACCHARATE, AMPHETAMINE ASPARTATE MONOHYDRATE, DEXTROAMPHETAMINE SULFATE AND AMPHETAMINE SULFATE 6.25; 6.25; 6.25; 6.25 MG/1; MG/1; MG/1; MG/1
25 CAPSULE, EXTENDED RELEASE ORAL DAILY
Qty: 30 CAPSULE | Refills: 0 | OUTPATIENT
Start: 2020-02-21 | End: 2020-03-22

## 2020-03-09 DIAGNOSIS — R39.15 URINARY URGENCY: ICD-10-CM

## 2020-03-09 RX ORDER — OXYBUTYNIN CHLORIDE 10 MG/1
TABLET, EXTENDED RELEASE ORAL
Qty: 90 TABLET | Refills: 0 | Status: SHIPPED | OUTPATIENT
Start: 2020-03-09 | End: 2020-07-18

## 2020-03-09 NOTE — TELEPHONE ENCOUNTER
Rx Request  OXYBUTYNIN CHLORIDE ER 10 MG Oral Tablet 24 Hr    Disp:      90              R: 0    Associated Dx: Urinary urgency    Last Visit: 10/09/2019    Last Refilled: 10/21/2019

## 2020-03-17 ENCOUNTER — OFFICE VISIT (OUTPATIENT)
Dept: FAMILY MEDICINE CLINIC | Facility: CLINIC | Age: 35
End: 2020-03-17
Payer: COMMERCIAL

## 2020-03-17 VITALS
SYSTOLIC BLOOD PRESSURE: 132 MMHG | WEIGHT: 179 LBS | TEMPERATURE: 98 F | OXYGEN SATURATION: 98 % | HEART RATE: 77 BPM | RESPIRATION RATE: 18 BRPM | BODY MASS INDEX: 27.13 KG/M2 | HEIGHT: 68 IN | DIASTOLIC BLOOD PRESSURE: 74 MMHG

## 2020-03-17 DIAGNOSIS — Z00.00 ANNUAL PHYSICAL EXAM: Primary | ICD-10-CM

## 2020-03-17 DIAGNOSIS — F90.8 ATTENTION DEFICIT HYPERACTIVITY DISORDER (ADHD), OTHER TYPE: ICD-10-CM

## 2020-03-17 DIAGNOSIS — H61.23 BILATERAL IMPACTED CERUMEN: ICD-10-CM

## 2020-03-17 DIAGNOSIS — F41.9 ANXIETY AND DEPRESSION: ICD-10-CM

## 2020-03-17 DIAGNOSIS — F32.A ANXIETY AND DEPRESSION: ICD-10-CM

## 2020-03-17 PROCEDURE — 99395 PREV VISIT EST AGE 18-39: CPT | Performed by: FAMILY MEDICINE

## 2020-03-17 PROCEDURE — 69210 REMOVE IMPACTED EAR WAX UNI: CPT | Performed by: FAMILY MEDICINE

## 2020-03-17 PROCEDURE — 99213 OFFICE O/P EST LOW 20 MIN: CPT | Performed by: FAMILY MEDICINE

## 2020-03-17 RX ORDER — DEXTROAMPHETAMINE SACCHARATE, AMPHETAMINE ASPARTATE MONOHYDRATE, DEXTROAMPHETAMINE SULFATE AND AMPHETAMINE SULFATE 6.25; 6.25; 6.25; 6.25 MG/1; MG/1; MG/1; MG/1
25 CAPSULE, EXTENDED RELEASE ORAL DAILY
Qty: 30 CAPSULE | Refills: 0 | Status: SHIPPED | OUTPATIENT
Start: 2020-03-17 | End: 2020-07-01

## 2020-03-17 RX ORDER — DEXTROAMPHETAMINE SACCHARATE, AMPHETAMINE ASPARTATE MONOHYDRATE, DEXTROAMPHETAMINE SULFATE AND AMPHETAMINE SULFATE 6.25; 6.25; 6.25; 6.25 MG/1; MG/1; MG/1; MG/1
25 CAPSULE, EXTENDED RELEASE ORAL DAILY
Qty: 30 CAPSULE | Refills: 0 | Status: SHIPPED | OUTPATIENT
Start: 2020-05-18 | End: 2020-07-01

## 2020-03-17 RX ORDER — ESCITALOPRAM OXALATE 20 MG/1
20 TABLET ORAL DAILY
Qty: 90 TABLET | Refills: 1 | Status: SHIPPED | OUTPATIENT
Start: 2020-03-17 | End: 2020-10-12

## 2020-03-17 RX ORDER — DEXTROAMPHETAMINE SACCHARATE, AMPHETAMINE ASPARTATE MONOHYDRATE, DEXTROAMPHETAMINE SULFATE AND AMPHETAMINE SULFATE 6.25; 6.25; 6.25; 6.25 MG/1; MG/1; MG/1; MG/1
25 CAPSULE, EXTENDED RELEASE ORAL DAILY
Qty: 30 CAPSULE | Refills: 0 | Status: SHIPPED | OUTPATIENT
Start: 2020-04-17 | End: 2020-07-01

## 2020-03-17 NOTE — PROGRESS NOTES
Yara Cuevas is a 28year old male who presents for a complete physical exam.   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  Bilateral impacted c ROTATOR CUFF,ACUTE     • VASECTOMY  5/25/17    Dr. Laqueta Dandy       No family history on file.    Social History:  Social History    Tobacco Use      Smoking status: Never Smoker      Smokeless tobacco: Never Used    Alcohol use: Yes      Comment: occassional     XR) 25 MG Oral Capsule SR 24 Hr; Take 1 capsule (25 mg total) by mouth daily. Dispense: 30 capsule; Refill: 0      The patient indicates understanding of these issues and agrees to the plan.   RTC 6 mo or sooner if needed

## 2020-07-01 DIAGNOSIS — F90.8 ATTENTION DEFICIT HYPERACTIVITY DISORDER (ADHD), OTHER TYPE: ICD-10-CM

## 2020-07-01 RX ORDER — DEXTROAMPHETAMINE SACCHARATE, AMPHETAMINE ASPARTATE MONOHYDRATE, DEXTROAMPHETAMINE SULFATE AND AMPHETAMINE SULFATE 6.25; 6.25; 6.25; 6.25 MG/1; MG/1; MG/1; MG/1
25 CAPSULE, EXTENDED RELEASE ORAL DAILY
Qty: 30 CAPSULE | Refills: 0 | Status: SHIPPED | OUTPATIENT
Start: 2020-07-01 | End: 2020-07-31

## 2020-07-01 RX ORDER — DEXTROAMPHETAMINE SACCHARATE, AMPHETAMINE ASPARTATE MONOHYDRATE, DEXTROAMPHETAMINE SULFATE AND AMPHETAMINE SULFATE 6.25; 6.25; 6.25; 6.25 MG/1; MG/1; MG/1; MG/1
25 CAPSULE, EXTENDED RELEASE ORAL DAILY
Qty: 30 CAPSULE | Refills: 0 | Status: SHIPPED | OUTPATIENT
Start: 2020-08-27 | End: 2020-10-03

## 2020-07-01 RX ORDER — DEXTROAMPHETAMINE SACCHARATE, AMPHETAMINE ASPARTATE MONOHYDRATE, DEXTROAMPHETAMINE SULFATE AND AMPHETAMINE SULFATE 6.25; 6.25; 6.25; 6.25 MG/1; MG/1; MG/1; MG/1
25 CAPSULE, EXTENDED RELEASE ORAL DAILY
Qty: 30 CAPSULE | Refills: 0 | Status: SHIPPED | OUTPATIENT
Start: 2020-07-29 | End: 2020-08-29

## 2020-07-18 DIAGNOSIS — R39.15 URINARY URGENCY: ICD-10-CM

## 2020-07-18 RX ORDER — OXYBUTYNIN CHLORIDE 10 MG/1
TABLET, EXTENDED RELEASE ORAL
Qty: 90 TABLET | Refills: 0 | Status: SHIPPED | OUTPATIENT
Start: 2020-07-18 | End: 2020-10-12

## 2020-08-17 ENCOUNTER — OFFICE VISIT (OUTPATIENT)
Dept: FAMILY MEDICINE CLINIC | Facility: CLINIC | Age: 35
End: 2020-08-17
Payer: COMMERCIAL

## 2020-08-17 VITALS
DIASTOLIC BLOOD PRESSURE: 76 MMHG | SYSTOLIC BLOOD PRESSURE: 120 MMHG | OXYGEN SATURATION: 98 % | RESPIRATION RATE: 18 BRPM | HEIGHT: 68 IN | WEIGHT: 173 LBS | TEMPERATURE: 98 F | HEART RATE: 83 BPM | BODY MASS INDEX: 26.22 KG/M2

## 2020-08-17 DIAGNOSIS — L30.1 DYSHIDROTIC ECZEMA: Primary | ICD-10-CM

## 2020-08-17 PROCEDURE — 99213 OFFICE O/P EST LOW 20 MIN: CPT | Performed by: FAMILY MEDICINE

## 2020-08-17 PROCEDURE — 3008F BODY MASS INDEX DOCD: CPT | Performed by: FAMILY MEDICINE

## 2020-08-17 PROCEDURE — 3074F SYST BP LT 130 MM HG: CPT | Performed by: FAMILY MEDICINE

## 2020-08-17 PROCEDURE — 3078F DIAST BP <80 MM HG: CPT | Performed by: FAMILY MEDICINE

## 2020-08-17 RX ORDER — BETAMETHASONE DIPROPIONATE 0.05 %
OINTMENT (GRAM) TOPICAL
Qty: 50 G | Refills: 0 | Status: SHIPPED | OUTPATIENT
Start: 2020-08-17 | End: 2021-04-05

## 2020-08-17 NOTE — PROGRESS NOTES
Cori Marx is a 28year old male who presents for rash   HPI:   Started a few years ago   Will be intermittent, worse in the winter   Pt c/o painful dry skin patches around cuticles   No blisters   Pt reports they can start as a dry patches and then stream  MUSCULOSKELETAL: denies back pain  NEURO: denies headaches  PSYCHE: denies depression or anxiety  HEMATOLOGIC: denies hx of anemia  ENDOCRINE: denies thyroid history  ALL/ASTHMA: denies hx of allergy or asthma    EXAM:   /76   Pulse 83   Temp

## 2020-10-03 ENCOUNTER — PATIENT MESSAGE (OUTPATIENT)
Dept: FAMILY MEDICINE CLINIC | Facility: CLINIC | Age: 35
End: 2020-10-03

## 2020-10-03 DIAGNOSIS — F90.8 ATTENTION DEFICIT HYPERACTIVITY DISORDER (ADHD), OTHER TYPE: ICD-10-CM

## 2020-10-03 RX ORDER — DEXTROAMPHETAMINE SACCHARATE, AMPHETAMINE ASPARTATE MONOHYDRATE, DEXTROAMPHETAMINE SULFATE AND AMPHETAMINE SULFATE 6.25; 6.25; 6.25; 6.25 MG/1; MG/1; MG/1; MG/1
25 CAPSULE, EXTENDED RELEASE ORAL DAILY
Qty: 30 CAPSULE | Refills: 0 | Status: SHIPPED | OUTPATIENT
Start: 2020-10-03 | End: 2020-11-02

## 2020-10-03 NOTE — TELEPHONE ENCOUNTER
From: Samina Bursn  To: Maria Elena Mccormick DO  Sent: 10/3/2020 6:55 AM CDT  Subject: Prescription Question    I need to get another prescription for my Amphetamine Salts sent to my pharmacy.  I'm not sure how but they said the ones I have on file  an

## 2020-10-11 NOTE — PROGRESS NOTES
Jana Serrato is a 28year old male who presents for depression and ADD  HPI:     Pt feels the ADD meds working well  Pt reports if he misses a day or 2 he can tell     Pt feels the lexapro is working well for mood     Pt was exposed to ETOH father / stream  MUSCULOSKELETAL: denies back pain  NEURO: denies headaches  PSYCHE: denies depression or anxiety  HEMATOLOGIC: denies hx of anemia  ENDOCRINE: denies thyroid history  ALL/ASTHMA: denies hx of allergy or asthma    EXAM:   /64   Pulse (!) 143

## 2020-10-31 ENCOUNTER — PATIENT MESSAGE (OUTPATIENT)
Dept: FAMILY MEDICINE CLINIC | Facility: CLINIC | Age: 35
End: 2020-10-31

## 2020-11-02 RX ORDER — DEXTROAMPHETAMINE SACCHARATE, AMPHETAMINE ASPARTATE MONOHYDRATE, DEXTROAMPHETAMINE SULFATE AND AMPHETAMINE SULFATE 6.25; 6.25; 6.25; 6.25 MG/1; MG/1; MG/1; MG/1
25 CAPSULE, EXTENDED RELEASE ORAL DAILY
Qty: 30 CAPSULE | Refills: 0 | Status: SHIPPED | OUTPATIENT
Start: 2020-11-02 | End: 2020-12-02

## 2020-11-02 RX ORDER — DEXTROAMPHETAMINE SACCHARATE, AMPHETAMINE ASPARTATE MONOHYDRATE, DEXTROAMPHETAMINE SULFATE AND AMPHETAMINE SULFATE 6.25; 6.25; 6.25; 6.25 MG/1; MG/1; MG/1; MG/1
25 CAPSULE, EXTENDED RELEASE ORAL DAILY
Qty: 30 CAPSULE | Refills: 0 | Status: SHIPPED | OUTPATIENT
Start: 2020-12-03 | End: 2021-01-03

## 2020-11-02 RX ORDER — DEXTROAMPHETAMINE SACCHARATE, AMPHETAMINE ASPARTATE MONOHYDRATE, DEXTROAMPHETAMINE SULFATE AND AMPHETAMINE SULFATE 6.25; 6.25; 6.25; 6.25 MG/1; MG/1; MG/1; MG/1
25 CAPSULE, EXTENDED RELEASE ORAL DAILY
Qty: 30 CAPSULE | Refills: 0 | Status: SHIPPED | OUTPATIENT
Start: 2021-01-03 | End: 2021-02-08

## 2020-11-02 NOTE — TELEPHONE ENCOUNTER
Adderall XR 25mg refill request:   Last OV 10/12/2020 ADHD. Last refill 10/3/2020.   Approve or deny:

## 2020-11-02 NOTE — TELEPHONE ENCOUNTER
From: Mary Carmen Swann  To: Kirt Payment, DO  Sent: 10/31/2020 4:43 PM CDT  Subject: Prescription Question    I need to get a 3 month prescription refill for my adderall

## 2021-01-10 ENCOUNTER — PATIENT MESSAGE (OUTPATIENT)
Dept: FAMILY MEDICINE CLINIC | Facility: CLINIC | Age: 36
End: 2021-01-10

## 2021-01-11 ENCOUNTER — TELEPHONE (OUTPATIENT)
Dept: FAMILY MEDICINE CLINIC | Facility: CLINIC | Age: 36
End: 2021-01-11

## 2021-01-14 ENCOUNTER — HOSPITAL ENCOUNTER (EMERGENCY)
Age: 36
Discharge: HOME | End: 2021-01-14
Attending: EMERGENCY MEDICINE
Payer: COMMERCIAL

## 2021-01-14 VITALS
SYSTOLIC BLOOD PRESSURE: 149 MMHG | TEMPERATURE: 98 F | RESPIRATION RATE: 20 BRPM | OXYGEN SATURATION: 97 % | BODY MASS INDEX: 27 KG/M2 | WEIGHT: 180 LBS | HEART RATE: 82 BPM | DIASTOLIC BLOOD PRESSURE: 76 MMHG

## 2021-01-14 DIAGNOSIS — S61.412A LACERATION OF LEFT HAND, FOREIGN BODY PRESENCE UNSPECIFIED, INITIAL ENCOUNTER: Primary | ICD-10-CM

## 2021-01-14 PROCEDURE — 99283 EMERGENCY DEPT VISIT LOW MDM: CPT

## 2021-01-14 PROCEDURE — 12001 RPR S/N/AX/GEN/TRNK 2.5CM/<: CPT

## 2021-01-14 PROCEDURE — 90471 IMMUNIZATION ADMIN: CPT

## 2021-01-15 ENCOUNTER — PATIENT MESSAGE (OUTPATIENT)
Dept: FAMILY MEDICINE CLINIC | Facility: CLINIC | Age: 36
End: 2021-01-15
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
## 2021-01-15 NOTE — TELEPHONE ENCOUNTER
From: Angi Barnes  To: Puma Nunez DO  Sent: 1/15/2021 10:31 AM CST  Subject: Other    Good morning Dr. Nick Shetty,     I was seen last night in the ER for a hand injury and required two stitches and after today I will only need a band aid over it unt

## 2021-01-18 ENCOUNTER — OFFICE VISIT (OUTPATIENT)
Dept: FAMILY MEDICINE CLINIC | Facility: CLINIC | Age: 36
End: 2021-01-18
Payer: COMMERCIAL

## 2021-01-18 VITALS
HEART RATE: 77 BPM | HEIGHT: 68 IN | SYSTOLIC BLOOD PRESSURE: 132 MMHG | WEIGHT: 180 LBS | RESPIRATION RATE: 18 BRPM | DIASTOLIC BLOOD PRESSURE: 78 MMHG | OXYGEN SATURATION: 98 % | BODY MASS INDEX: 27.28 KG/M2 | TEMPERATURE: 98 F

## 2021-01-18 DIAGNOSIS — F41.9 ANXIETY: ICD-10-CM

## 2021-01-18 DIAGNOSIS — S61.419D LACERATION OF HAND WITHOUT FOREIGN BODY, UNSPECIFIED LATERALITY, SUBSEQUENT ENCOUNTER: Primary | ICD-10-CM

## 2021-01-18 PROCEDURE — 3008F BODY MASS INDEX DOCD: CPT | Performed by: FAMILY MEDICINE

## 2021-01-18 PROCEDURE — 3075F SYST BP GE 130 - 139MM HG: CPT | Performed by: FAMILY MEDICINE

## 2021-01-18 PROCEDURE — 99214 OFFICE O/P EST MOD 30 MIN: CPT | Performed by: FAMILY MEDICINE

## 2021-01-18 PROCEDURE — 3078F DIAST BP <80 MM HG: CPT | Performed by: FAMILY MEDICINE

## 2021-01-18 RX ORDER — BUSPIRONE HYDROCHLORIDE 5 MG/1
5 TABLET ORAL 3 TIMES DAILY
Qty: 90 TABLET | Refills: 0 | Status: SHIPPED | OUTPATIENT
Start: 2021-01-18 | End: 2021-02-08

## 2021-01-18 RX ORDER — DEXTROAMPHETAMINE SACCHARATE, AMPHETAMINE ASPARTATE MONOHYDRATE, DEXTROAMPHETAMINE SULFATE AND AMPHETAMINE SULFATE 6.25; 6.25; 6.25; 6.25 MG/1; MG/1; MG/1; MG/1
25 CAPSULE, EXTENDED RELEASE ORAL DAILY
Qty: 30 CAPSULE | Refills: 0 | Status: CANCELLED | OUTPATIENT
Start: 2021-01-18 | End: 2021-02-17

## 2021-01-19 NOTE — PROGRESS NOTES
Fredy Chapa is a 28year old male who presents for wound check and anxiety     HPI:     S/p injury 4 days ago   S/p ER visit for suture repair of left hand   S/p tetanus vaccine   No fever  No wound discharge  Pt uses hands at work, feels he could with exertion  CARDIOVASCULAR: denies chest pain on exertion  GI: denies abdominal pain,denies heartburn  : denies nocturia or changes in stream  MUSCULOSKELETAL: denies back pain  NEURO: denies headaches  PSYCHE: denies depression or anxiety  HEMATOLOGI

## 2021-01-22 ENCOUNTER — HOSPITAL ENCOUNTER (EMERGENCY)
Age: 36
Discharge: HOME | End: 2021-01-22
Payer: COMMERCIAL

## 2021-01-22 VITALS
SYSTOLIC BLOOD PRESSURE: 137 MMHG | TEMPERATURE: 98 F | RESPIRATION RATE: 16 BRPM | DIASTOLIC BLOOD PRESSURE: 89 MMHG | BODY MASS INDEX: 27 KG/M2 | WEIGHT: 178.56 LBS | HEART RATE: 77 BPM | OXYGEN SATURATION: 98 %

## 2021-01-22 DIAGNOSIS — Z48.02 ENCOUNTER FOR REMOVAL OF SUTURES: Primary | ICD-10-CM

## 2021-01-22 DIAGNOSIS — S61.412D LACERATION OF LEFT HAND WITHOUT FOREIGN BODY, SUBSEQUENT ENCOUNTER: ICD-10-CM

## 2021-01-22 NOTE — ED PROVIDER NOTES
Patient Seen in: THE [facility] In [place]      History   Patient presents with:  Sut Stap Ruben    Stated Complaint: suture removal    HPI/Subjective:   HPI    CHIEF COMPLAINT: Suture removal     HISTORY OF PRESENT ILLNESS: Patient reviewed  General Appearance: No acute distress  Neurological:  A&Ox3,  Gait normal.  Psychiatric: calm and cooperative  Respiratory: Normal effort  Musculoskeletal: Extremities are symmetrical, full range of motion  Skin:  warm and dry, no rashes.    Left

## 2021-02-08 ENCOUNTER — PATIENT MESSAGE (OUTPATIENT)
Dept: FAMILY MEDICINE CLINIC | Facility: CLINIC | Age: 36
End: 2021-02-08

## 2021-02-08 DIAGNOSIS — F41.9 ANXIETY: ICD-10-CM

## 2021-02-08 RX ORDER — BUSPIRONE HYDROCHLORIDE 5 MG/1
TABLET ORAL
Qty: 90 TABLET | Refills: 0 | Status: SHIPPED | OUTPATIENT
Start: 2021-02-08 | End: 2021-03-05

## 2021-02-08 RX ORDER — DEXTROAMPHETAMINE SACCHARATE, AMPHETAMINE ASPARTATE MONOHYDRATE, DEXTROAMPHETAMINE SULFATE AND AMPHETAMINE SULFATE 6.25; 6.25; 6.25; 6.25 MG/1; MG/1; MG/1; MG/1
25 CAPSULE, EXTENDED RELEASE ORAL DAILY
Qty: 30 CAPSULE | Refills: 0 | Status: SHIPPED | OUTPATIENT
Start: 2021-02-08 | End: 2021-03-15

## 2021-02-08 NOTE — TELEPHONE ENCOUNTER
From: Bashir Rosa  To: Tsering Anderson DO  Sent: 2/8/2021 12:59 PM CST  Subject: Prescription Question    I'm on my last week of my adderall prescription and I'm going to need a refill so that I don't lapse.  [facility] [place] [facility] [address]

## 2021-02-09 RX ORDER — DEXTROAMPHETAMINE SACCHARATE, AMPHETAMINE ASPARTATE MONOHYDRATE, DEXTROAMPHETAMINE SULFATE AND AMPHETAMINE SULFATE 6.25; 6.25; 6.25; 6.25 MG/1; MG/1; MG/1; MG/1
25 CAPSULE, EXTENDED RELEASE ORAL DAILY
Qty: 30 CAPSULE | Refills: 0 | OUTPATIENT
Start: 2021-02-09 | End: 2021-03-11

## 2021-03-04 DIAGNOSIS — F41.9 ANXIETY: ICD-10-CM

## 2021-03-05 RX ORDER — BUSPIRONE HYDROCHLORIDE 5 MG/1
5 TABLET ORAL 3 TIMES DAILY
Qty: 10 TABLET | Refills: 0 | Status: SHIPPED | OUTPATIENT
Start: 2021-03-05 | End: 2021-04-05

## 2021-03-14 ENCOUNTER — PATIENT MESSAGE (OUTPATIENT)
Dept: FAMILY MEDICINE CLINIC | Facility: CLINIC | Age: 36
End: 2021-03-14

## 2021-03-15 DIAGNOSIS — Z23 NEED FOR VACCINATION: ICD-10-CM

## 2021-03-15 RX ORDER — DEXTROAMPHETAMINE SACCHARATE, AMPHETAMINE ASPARTATE MONOHYDRATE, DEXTROAMPHETAMINE SULFATE AND AMPHETAMINE SULFATE 6.25; 6.25; 6.25; 6.25 MG/1; MG/1; MG/1; MG/1
25 CAPSULE, EXTENDED RELEASE ORAL DAILY
Qty: 30 CAPSULE | Refills: 0 | Status: SHIPPED | OUTPATIENT
Start: 2021-03-15 | End: 2021-04-05

## 2021-03-15 NOTE — TELEPHONE ENCOUNTER
From: Jana Serrato  To: Vincent Pathak,   Sent: 3/14/2021 2:16 PM CDT  Subject: Prescription Question    I need a refill of my adderall and I was wondering if there was a way to send more than one prescription for it?  I know I used to be able to do th

## 2021-03-16 ENCOUNTER — TELEPHONE (OUTPATIENT)
Dept: FAMILY MEDICINE CLINIC | Facility: CLINIC | Age: 36
End: 2021-03-16

## 2021-03-16 NOTE — TELEPHONE ENCOUNTER
Pt needs a medication visit. Dr Terry Redd next available is 3/27/21. . he wants to know if dr Khang Bennett will be able to squeeze him in before that date.

## 2021-03-16 NOTE — TELEPHONE ENCOUNTER
Pt called to schedule. Next available is 3/27/21. He wants to know if you can squeeze him in sooner. Please advise. Is this med ck ok for VV?

## 2021-03-23 NOTE — TELEPHONE ENCOUNTER
Eric Burton, DO  [facility] 16 hours ago (10:03 PM)     [id number] [address], my chart sent.

## 2021-04-05 NOTE — PROGRESS NOTES
This visit is conducted using Telemedicine with live, interactive video and audio.     Telehealth outside of [address] Verbal Consent   I conducted a telehealth visit with Cori Marx today, 04/05/21, which was completed using two-way, real- smell   Pt feels like his typical spring allergies       Pt feels the ADD meds working well  No side effects     Pt stopped the lexapro due to no longer feeling depressed  Pt stopped a few months ago   Pt denies depression   Pt stopped the buspar as well / vision  HEENT: denies nasal congestion, sinus pain or ST  LUNGS: denies shortness of breath with exertion  CARDIOVASCULAR: denies chest pain on exertion  GI: denies abdominal pain,denies heartburn  : denies nocturia or changes in stream  MUSCULOSKELETAL: plan.  RTC 6 mo or sooner if needed

## 2021-04-06 ENCOUNTER — LAB ENCOUNTER (OUTPATIENT)
Dept: LAB | Age: 36
End: 2021-04-06
Attending: FAMILY MEDICINE
Payer: COMMERCIAL

## 2021-04-06 DIAGNOSIS — J06.9 UPPER RESPIRATORY TRACT INFECTION, UNSPECIFIED TYPE: ICD-10-CM

## 2021-06-29 ENCOUNTER — TELEPHONE (OUTPATIENT)
Dept: FAMILY MEDICINE CLINIC | Facility: CLINIC | Age: 36
End: 2021-06-29

## 2021-06-29 NOTE — TELEPHONE ENCOUNTER
Last telemed visit: 6/29/21. Spoke with patient, he did not want to leave any information with me, requesting to have LE call him back to finish telemed visit?   Phone: [phone]    Dr. Annette Dunlap, please see request.

## 2021-06-29 NOTE — TELEPHONE ENCOUNTER
Pt had appt with Dr. Duncan Ayala today. He has a \"couple more questions\" as their virtual appt was disconnected.

## 2021-06-29 NOTE — PROGRESS NOTES
This visit is conducted using Telemedicine with live, interactive video and audio.     Telehealth outside of [address] Verbal Consent   I conducted a telehealth visit with Eduin Aguero today, 06/29/21, which was completed using two-way, real- depressed     No regular exercise     No SI or HI   No hopelessness   some anhedonia  Appetite normal   Normal sleep   decrease energy / harder to engage in conversations   Very inattentive     No longer seeing the therapist     ((Pt was exposed to ETOH fa anemia  ENDOCRINE: denies thyroid history  ALL/ASTHMA: denies hx of allergy or asthma    EXAM:   alert, appears stated age and cooperative, Normocephalic, without obvious abnormality, atraumatic, lips, mucosa, and tongue normal; teeth and gums normal, Spea

## 2021-06-30 ENCOUNTER — TELEPHONE (OUTPATIENT)
Dept: FAMILY MEDICINE CLINIC | Facility: CLINIC | Age: 36
End: 2021-06-30

## 2021-07-13 ENCOUNTER — OFFICE VISIT (OUTPATIENT)
Dept: FAMILY MEDICINE CLINIC | Facility: CLINIC | Age: 36
End: 2021-07-13
Payer: COMMERCIAL

## 2021-07-13 VITALS
SYSTOLIC BLOOD PRESSURE: 110 MMHG | BODY MASS INDEX: 25.01 KG/M2 | HEART RATE: 67 BPM | TEMPERATURE: 98 F | RESPIRATION RATE: 18 BRPM | OXYGEN SATURATION: 99 % | DIASTOLIC BLOOD PRESSURE: 70 MMHG | HEIGHT: 68 IN | WEIGHT: 165 LBS

## 2021-07-13 DIAGNOSIS — Z20.822 SUSPECTED COVID-19 VIRUS INFECTION: Primary | ICD-10-CM

## 2021-07-13 DIAGNOSIS — J06.9 VIRAL UPPER RESPIRATORY TRACT INFECTION: ICD-10-CM

## 2021-07-13 PROCEDURE — 3074F SYST BP LT 130 MM HG: CPT | Performed by: NURSE PRACTITIONER

## 2021-07-13 PROCEDURE — 3008F BODY MASS INDEX DOCD: CPT | Performed by: NURSE PRACTITIONER

## 2021-07-13 PROCEDURE — 3078F DIAST BP <80 MM HG: CPT | Performed by: NURSE PRACTITIONER

## 2021-07-13 PROCEDURE — 99213 OFFICE O/P EST LOW 20 MIN: CPT | Performed by: NURSE PRACTITIONER

## 2021-07-13 RX ORDER — DEXTROAMPHETAMINE SACCHARATE, AMPHETAMINE ASPARTATE MONOHYDRATE, DEXTROAMPHETAMINE SULFATE AND AMPHETAMINE SULFATE 6.25; 6.25; 6.25; 6.25 MG/1; MG/1; MG/1; MG/1
CAPSULE, EXTENDED RELEASE ORAL
COMMUNITY
End: 2021-09-20

## 2021-07-13 NOTE — PATIENT INSTRUCTIONS
Flonase daily  Benadryl at night    Viral Upper Respiratory Illness (Adult)    You have a viral upper respiratory illness (URI), which is another term for the common cold. This illness is contagious during the first few days.  It is spread through the air medicines are safe to use. (Note: Don't use decongestants if you have high blood pressure.)  Follow-up care  Follow up with your healthcare provider, or as advised.   When to seek medical advice  Call your healthcare provider right away if any of these occu

## 2021-07-13 NOTE — PROGRESS NOTES
CHIEF COMPLAINT:   Patient presents with:  Nasal Congestion: x 5 days. not vaccinated. HPI:   Andrea Burris is a 39year old male who presents for upper respiratory symptoms for  5 days. Patient reports congestion.  Symptoms have been worsening si sinuses  EYES: conjunctiva clear, EOM intact  EARS: TM's without erythema, no bulging, no retraction,no fluid, bony landmarks visible  NOSE: Nostrils patent, clear nasal discharge, nasal mucosa inflamed   THROAT: Oral mucosa pink, moist. Posterior pharynx · If symptoms are severe, rest at home for the first 2 to 3 days. When you resume activity, don't let yourself get too tired. · Don't smoke. If you need help stopping, talk with your healthcare provider.   · Avoid being exposed to cigarette smoke (yours o Isabell last reviewed this educational content on 6/1/2018  © 4017-9750 [employer]. All rights reserved. This information is not intended as a substitute for professional medical care.  Always follow your healthcare professional's instructio

## 2021-07-14 LAB — SARS-COV-2 RNA RESP QL NAA+PROBE: NOT DETECTED

## 2021-07-19 RX ORDER — DEXTROAMPHETAMINE SACCHARATE, AMPHETAMINE ASPARTATE MONOHYDRATE, DEXTROAMPHETAMINE SULFATE AND AMPHETAMINE SULFATE 6.25; 6.25; 6.25; 6.25 MG/1; MG/1; MG/1; MG/1
25 CAPSULE, EXTENDED RELEASE ORAL EVERY MORNING
Qty: 30 CAPSULE | Refills: 0 | Status: SHIPPED | OUTPATIENT
Start: 2021-07-19 | End: 2021-09-20

## 2021-07-19 NOTE — TELEPHONE ENCOUNTER
Dr. Celio Hernandez, please see request for refill on Adderall while Vyvanse paperwork is processing.    Approve/deny:

## 2021-07-19 NOTE — TELEPHONE ENCOUNTER
Patient returned our call. I read entry from Reyes. His concern at this time is he is on his last day of Adderall. So until this is resolved, can he refill that medication.

## 2021-07-19 NOTE — TELEPHONE ENCOUNTER
Paperwork received from [facility] pharmacy:  Vyvanse 40mg capsules not covered.    Covered alternatives: methylphenidate HCL 10mg tab  Atomoxetine 40mg cap  Gunafacine 1mg tb24  Dextroamphetamine- amphetamine 30mg cp24  Dexmethylphenidate 10mg BP50  Methylphenida

## 2021-07-19 NOTE — TELEPHONE ENCOUNTER
Patient called to check status of PA on his Vyvanse. Insurance company told him to contact us as they have no record of it.     Please call patient at [phone] with status
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
## 2021-12-18 ENCOUNTER — OFFICE VISIT (OUTPATIENT)
Dept: FAMILY MEDICINE CLINIC | Facility: CLINIC | Age: 36
End: 2021-12-18
Payer: COMMERCIAL

## 2021-12-18 VITALS — TEMPERATURE: 98 F | HEART RATE: 86 BPM | OXYGEN SATURATION: 98 %

## 2021-12-18 DIAGNOSIS — Z20.822 EXPOSURE TO COVID-19 VIRUS: ICD-10-CM

## 2021-12-18 DIAGNOSIS — Z20.822 ENCOUNTER FOR LABORATORY TESTING FOR COVID-19 VIRUS: Primary | ICD-10-CM

## 2021-12-18 PROCEDURE — 99213 OFFICE O/P EST LOW 20 MIN: CPT | Performed by: FAMILY MEDICINE

## 2021-12-18 NOTE — PROGRESS NOTES
CHIEF COMPLAINT:   Patient presents with:  Covid: Expose to daughter who tested positive, needs a test - Entered by patient      HPI:   Marisa Calle is a 39year old male who presents for Covid 19 exposure 4 days ago. Exposure: daughter tested pos. .. s no lymphadenopathy.         ASSESSMENT AND PLAN:   Fredy Chapa is a 39year old male who presents with     Encounter for laboratory testing for covid-19 virus  (primary encounter diagnosis)  Exposure to covid-19 virus    Orders Placed This Encounter

## 2021-12-22 DIAGNOSIS — F90.8 ATTENTION DEFICIT HYPERACTIVITY DISORDER (ADHD), OTHER TYPE: ICD-10-CM

## 2021-12-22 NOTE — TELEPHONE ENCOUNTER
Pt sched his med check for late January but needs his vyvanse now.   Pls send script to [facility] in [facility]
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
## 2022-05-02 ENCOUNTER — OFFICE VISIT (OUTPATIENT)
Dept: FAMILY MEDICINE CLINIC | Facility: CLINIC | Age: 37
End: 2022-05-02
Payer: COMMERCIAL

## 2022-05-02 VITALS
BODY MASS INDEX: 26.37 KG/M2 | HEART RATE: 66 BPM | DIASTOLIC BLOOD PRESSURE: 72 MMHG | RESPIRATION RATE: 16 BRPM | WEIGHT: 174 LBS | HEIGHT: 68 IN | SYSTOLIC BLOOD PRESSURE: 110 MMHG | OXYGEN SATURATION: 99 %

## 2022-05-02 DIAGNOSIS — Z00.00 ANNUAL PHYSICAL EXAM: Primary | ICD-10-CM

## 2022-05-02 DIAGNOSIS — Z85.528 HISTORY OF WILMS' TUMOR: ICD-10-CM

## 2022-05-02 DIAGNOSIS — E04.9 GOITER: ICD-10-CM

## 2022-05-02 DIAGNOSIS — Z90.5 HISTORY OF NEPHRECTOMY: ICD-10-CM

## 2022-05-02 DIAGNOSIS — F90.8 ATTENTION DEFICIT HYPERACTIVITY DISORDER (ADHD), OTHER TYPE: ICD-10-CM

## 2022-05-02 PROCEDURE — 3078F DIAST BP <80 MM HG: CPT | Performed by: FAMILY MEDICINE

## 2022-05-02 PROCEDURE — 3008F BODY MASS INDEX DOCD: CPT | Performed by: FAMILY MEDICINE

## 2022-05-02 PROCEDURE — 99395 PREV VISIT EST AGE 18-39: CPT | Performed by: FAMILY MEDICINE

## 2022-05-02 PROCEDURE — 3074F SYST BP LT 130 MM HG: CPT | Performed by: FAMILY MEDICINE

## 2022-05-02 PROCEDURE — 99213 OFFICE O/P EST LOW 20 MIN: CPT | Performed by: FAMILY MEDICINE

## 2022-05-23 ENCOUNTER — PATIENT MESSAGE (OUTPATIENT)
Dept: FAMILY MEDICINE CLINIC | Facility: CLINIC | Age: 37
End: 2022-05-23

## 2022-05-23 DIAGNOSIS — F90.8 ATTENTION DEFICIT HYPERACTIVITY DISORDER (ADHD), OTHER TYPE: ICD-10-CM

## 2022-05-23 NOTE — TELEPHONE ENCOUNTER
From: Arabella Schirmer  To: Stephanie Jha DO  Sent: 5/23/2022 12:43 PM CDT  Subject: 3 month Vyvanse refill    I need a 3 month Vyvanse refill please.

## 2022-09-12 ENCOUNTER — PATIENT MESSAGE (OUTPATIENT)
Dept: FAMILY MEDICINE CLINIC | Facility: CLINIC | Age: 37
End: 2022-09-12

## 2022-09-12 NOTE — TELEPHONE ENCOUNTER
From: Milena Cabrera  Sent: 9/12/2022 3:18 PM CDT  To: [facility] Clinical Staff  Subject: 3 month Vyvanse refill    I need another 3 month Vyvanse refill please

## 2022-12-28 ENCOUNTER — PATIENT MESSAGE (OUTPATIENT)
Dept: FAMILY MEDICINE CLINIC | Facility: CLINIC | Age: 37
End: 2022-12-28

## 2022-12-29 NOTE — TELEPHONE ENCOUNTER
LE-  Please see refill request for Vyvanse. Last OV: 5/2/22  No upcoming appts booked  Labs from May still pending. Medication pended for review.

## 2022-12-29 NOTE — TELEPHONE ENCOUNTER
From: Irene Hernandez  Sent: 12/28/2022 4:14 PM CST  To: [facility] Clinical Staff  Subject: 3 month Vyvanse refill    I need a 3 month refill of Vyvanse please

## 2023-05-02 DIAGNOSIS — F90.8 ATTENTION DEFICIT HYPERACTIVITY DISORDER (ADHD), OTHER TYPE: ICD-10-CM

## 2023-08-26 ENCOUNTER — PATIENT MESSAGE (OUTPATIENT)
Dept: FAMILY MEDICINE CLINIC | Facility: CLINIC | Age: 38
End: 2023-08-26

## 2023-08-28 NOTE — TELEPHONE ENCOUNTER
From: Riya Lee  To: Tk Ch DO  Sent: 8/26/2023 11:01 AM CDT  Subject: Vyvanse refill    I need to get another 3 month Vyvanse refill please

## 2023-10-12 ENCOUNTER — PATIENT MESSAGE (OUTPATIENT)
Dept: FAMILY MEDICINE CLINIC | Facility: CLINIC | Age: 38
End: 2023-10-12

## 2023-10-13 NOTE — TELEPHONE ENCOUNTER
From: Amadeo Packer  To: Cathleenana Hays  Sent: 10/12/2023 4:47 PM CDT  Subject: Rainer Donald, I need your help with the insurance for my Vyvanse because they are saying they won't cover it anymore because the generic prescription is the only thing they'll cover but we know that doesn't work because I've tried it so I just need you to call the insurance and let them know that this is what works for me and I won't be able to  my refill until then. Thank you for your help.

## 2024-06-14 DIAGNOSIS — F90.8 ATTENTION DEFICIT HYPERACTIVITY DISORDER (ADHD), OTHER TYPE: ICD-10-CM

## 2024-06-17 ENCOUNTER — PATIENT MESSAGE (OUTPATIENT)
Dept: FAMILY MEDICINE CLINIC | Facility: CLINIC | Age: 39
End: 2024-06-17

## 2024-06-17 DIAGNOSIS — Z79.899 MEDICATION MANAGEMENT: ICD-10-CM

## 2024-06-17 DIAGNOSIS — F90.8 ATTENTION DEFICIT HYPERACTIVITY DISORDER (ADHD), OTHER TYPE: ICD-10-CM

## 2024-06-18 RX ORDER — METHYLPHENIDATE HYDROCHLORIDE EXTENDED RELEASE 20 MG/1
20 TABLET ORAL EVERY MORNING
Qty: 30 TABLET | Refills: 0 | Status: SHIPPED | OUTPATIENT
Start: 2024-06-18

## 2024-06-18 RX ORDER — METHYLPHENIDATE HYDROCHLORIDE 20 MG/1
1 CAPSULE, EXTENDED RELEASE ORAL DAILY
Qty: 30 CAPSULE | Refills: 0 | Status: SHIPPED | OUTPATIENT
Start: 2024-06-18 | End: 2024-07-18

## 2024-06-18 NOTE — TELEPHONE ENCOUNTER
From: Sudhakar Preciado  To: Fabienne Rajan  Sent: 6/17/2024 9:19 PM CDT  Subject: Medication    I need to get refills on my medication because I'm currently out.     Thank you

## 2024-06-18 NOTE — TELEPHONE ENCOUNTER
.A refill request was received for:  Requested Prescriptions     Pending Prescriptions Disp Refills    Methylphenidate HCl ER 20 MG Oral Tab CR 30 tablet 0     Sig: Take 1 tablet (20 mg total) by mouth every morning.       Last refill date:   5/14/2024    Last office visit: 12/12/2023    Follow up due:  Future Appointments   Date Time Provider Department Center   9/17/2024  5:00 PM Fabienne Rajan DO [facility]

## 2024-07-11 DIAGNOSIS — F90.8 ATTENTION DEFICIT HYPERACTIVITY DISORDER (ADHD), OTHER TYPE: ICD-10-CM

## 2024-07-11 DIAGNOSIS — Z79.899 MEDICATION MANAGEMENT: ICD-10-CM

## 2024-07-12 NOTE — TELEPHONE ENCOUNTER
.A refill request was received for:  Requested Prescriptions     Pending Prescriptions Disp Refills    Methylphenidate HCl ER, XR, 20 MG Oral Capsule SR 24 Hr 30 capsule 0     Sig: Take 1 capsule by mouth daily.       Last refill date:   6/18/2024    Last office visit: 3/13/2024    Follow up due:  Future Appointments   Date Time Provider Department Center   9/17/2024  5:00 PM Fabienne Rajan DO [facility]

## 2024-07-15 RX ORDER — METHYLPHENIDATE HYDROCHLORIDE 20 MG/1
1 CAPSULE, EXTENDED RELEASE ORAL DAILY
Qty: 30 CAPSULE | Refills: 0 | Status: SHIPPED | OUTPATIENT
Start: 2024-07-15 | End: 2024-08-14

## 2024-08-17 DIAGNOSIS — F90.8 ATTENTION DEFICIT HYPERACTIVITY DISORDER (ADHD), OTHER TYPE: ICD-10-CM

## 2024-08-19 RX ORDER — METHYLPHENIDATE HYDROCHLORIDE EXTENDED RELEASE 20 MG/1
20 TABLET ORAL EVERY MORNING
Qty: 30 TABLET | Refills: 0 | Status: SHIPPED | OUTPATIENT
Start: 2024-08-19

## 2024-08-19 NOTE — TELEPHONE ENCOUNTER
.A refill request was received for:  Requested Prescriptions     Pending Prescriptions Disp Refills    Methylphenidate HCl ER 20 MG Oral Tab CR 30 tablet 0     Sig: Take 1 tablet (20 mg total) by mouth every morning.       Last refill date:   6/18/2024    Last office visit: 3/13/2024    Follow up due:  Future Appointments   Date Time Provider Department Center   9/17/2024  5:00 PM Fabienne Rajan DO [facility]

## 2024-09-16 DIAGNOSIS — F90.8 ATTENTION DEFICIT HYPERACTIVITY DISORDER (ADHD), OTHER TYPE: ICD-10-CM

## 2024-09-16 RX ORDER — METHYLPHENIDATE HYDROCHLORIDE EXTENDED RELEASE 20 MG/1
20 TABLET ORAL EVERY MORNING
Qty: 30 TABLET | Refills: 0 | Status: SHIPPED | OUTPATIENT
Start: 2024-09-16

## 2024-09-16 NOTE — TELEPHONE ENCOUNTER
A refill request was received for:  Requested Prescriptions     Pending Prescriptions Disp Refills    Methylphenidate HCl ER 20 MG Oral Tab CR 30 tablet 0     Sig: Take 1 tablet (20 mg total) by mouth every morning.       Last refill date:8/19/2024       Last office visit:3/13/2024 -has appt for tomorrow 9/16    Follow up due:  Future Appointments   Date Time Provider Department Center   9/17/2024  5:00 PM Fabienne Rajan DO [facility]

## 2024-09-17 ENCOUNTER — OFFICE VISIT (OUTPATIENT)
Dept: FAMILY MEDICINE CLINIC | Facility: CLINIC | Age: 39
End: 2024-09-17
Payer: COMMERCIAL

## 2024-09-17 VITALS
WEIGHT: 170 LBS | HEART RATE: 62 BPM | OXYGEN SATURATION: 98 % | DIASTOLIC BLOOD PRESSURE: 74 MMHG | RESPIRATION RATE: 16 BRPM | HEIGHT: 68 IN | BODY MASS INDEX: 25.76 KG/M2 | SYSTOLIC BLOOD PRESSURE: 118 MMHG

## 2024-09-17 DIAGNOSIS — F90.8 OTHER SPECIFIED ATTENTION DEFICIT HYPERACTIVITY DISORDER (ADHD): ICD-10-CM

## 2024-09-17 DIAGNOSIS — Z79.899 MEDICATION MANAGEMENT: ICD-10-CM

## 2024-09-17 DIAGNOSIS — Z00.00 ANNUAL PHYSICAL EXAM: Primary | ICD-10-CM

## 2024-09-17 DIAGNOSIS — Z85.528 HISTORY OF WILMS' TUMOR: ICD-10-CM

## 2024-09-17 DIAGNOSIS — Z90.5 HISTORY OF NEPHRECTOMY: ICD-10-CM

## 2024-09-17 DIAGNOSIS — D22.9 ATYPICAL NEVI: ICD-10-CM

## 2024-09-17 PROCEDURE — 3078F DIAST BP <80 MM HG: CPT | Performed by: FAMILY MEDICINE

## 2024-09-17 PROCEDURE — 99214 OFFICE O/P EST MOD 30 MIN: CPT | Performed by: FAMILY MEDICINE

## 2024-09-17 PROCEDURE — 3074F SYST BP LT 130 MM HG: CPT | Performed by: FAMILY MEDICINE

## 2024-09-17 PROCEDURE — 3008F BODY MASS INDEX DOCD: CPT | Performed by: FAMILY MEDICINE

## 2024-09-17 PROCEDURE — 99395 PREV VISIT EST AGE 18-39: CPT | Performed by: FAMILY MEDICINE

## 2024-09-17 RX ORDER — METHYLPHENIDATE HYDROCHLORIDE 20 MG/1
1 CAPSULE, EXTENDED RELEASE ORAL DAILY
Qty: 30 CAPSULE | Refills: 0 | Status: SHIPPED | OUTPATIENT
Start: 2024-11-15 | End: 2024-12-15

## 2024-09-17 RX ORDER — METHYLPHENIDATE HYDROCHLORIDE 20 MG/1
1 CAPSULE, EXTENDED RELEASE ORAL DAILY
Qty: 30 CAPSULE | Refills: 0 | Status: SHIPPED | OUTPATIENT
Start: 2024-10-16 | End: 2024-11-15

## 2024-10-22 NOTE — TELEPHONE ENCOUNTER
Pre-Procedure Text: The patient was placed in a recumbant position on the procedure table. Spoke with patient and relayed EEG results from Dr. Tate Brooks. Pt verbalized understanding. Answered all questions and pt was encouraged to call office with any additional questions or concerns. Blade: [place]arrant scalpel Post-Procedure Instructions: Following the dermaplane procedure, Oxymist treatment was applied to the treatment areas. Moisturizer and SPF was applied. Post-Care Instructions: I reviewed with the patient in detail post-care instructions. Detail Level: Zone Treatment Areas: face and neck Treatment Area Prep: acetone

## 2024-12-21 ENCOUNTER — PATIENT MESSAGE (OUTPATIENT)
Dept: FAMILY MEDICINE CLINIC | Facility: CLINIC | Age: 39
End: 2024-12-21

## 2024-12-21 DIAGNOSIS — Z79.899 MEDICATION MANAGEMENT: ICD-10-CM

## 2024-12-21 DIAGNOSIS — F90.8 OTHER SPECIFIED ATTENTION DEFICIT HYPERACTIVITY DISORDER (ADHD): ICD-10-CM

## 2024-12-23 NOTE — TELEPHONE ENCOUNTER
Requesting   Requested Prescriptions     Pending Prescriptions Disp Refills    Methylphenidate HCl ER 20 MG Oral Tab CR 30 tablet 0     Sig: Take 1 tablet (20 mg total) by mouth every morning.         LOV: 9/17/24    Filled:  11/15/24 #30 with 0 refills    Future Appointments   Date Time Provider Department Center   3/18/2025  3:30 PM Fabienne Rajan DO [facility]

## 2024-12-24 RX ORDER — METHYLPHENIDATE HYDROCHLORIDE EXTENDED RELEASE 20 MG/1
20 TABLET ORAL EVERY MORNING
Qty: 30 TABLET | Refills: 0 | Status: SHIPPED | OUTPATIENT
Start: 2024-12-24

## 2025-01-31 DIAGNOSIS — F90.8 OTHER SPECIFIED ATTENTION DEFICIT HYPERACTIVITY DISORDER (ADHD): ICD-10-CM

## 2025-01-31 RX ORDER — METHYLPHENIDATE HYDROCHLORIDE EXTENDED RELEASE 20 MG/1
20 TABLET ORAL EVERY MORNING
Qty: 30 TABLET | Refills: 0 | Status: SHIPPED | OUTPATIENT
Start: 2025-01-31

## 2025-01-31 NOTE — TELEPHONE ENCOUNTER
A refill request was received for:  Requested Prescriptions     Pending Prescriptions Disp Refills    Methylphenidate HCl ER 20 MG Oral Tab CR 30 tablet 0     Sig: Take 1 tablet (20 mg total) by mouth every morning.       Last refill date:   12/24/2024    Last office visit: 9/17/24 (Px)    Follow up due:  Future Appointments   Date Time Provider Department Center   3/18/2025  3:30 PM Fabienne Rajan DO [facility]

## 2025-02-14 ENCOUNTER — HOSPITAL ENCOUNTER (OUTPATIENT)
Dept: GENERAL RADIOLOGY | Age: 40
Discharge: HOME | End: 2025-02-14
Attending: PHYSICIAN ASSISTANT
Payer: COMMERCIAL

## 2025-02-14 ENCOUNTER — OFFICE VISIT (OUTPATIENT)
Dept: FAMILY MEDICINE CLINIC | Facility: CLINIC | Age: 40
End: 2025-02-14
Payer: COMMERCIAL

## 2025-02-14 VITALS
WEIGHT: 170 LBS | RESPIRATION RATE: 16 BRPM | OXYGEN SATURATION: 97 % | DIASTOLIC BLOOD PRESSURE: 84 MMHG | SYSTOLIC BLOOD PRESSURE: 140 MMHG | HEIGHT: 68 IN | HEART RATE: 82 BPM | BODY MASS INDEX: 25.76 KG/M2

## 2025-02-14 DIAGNOSIS — M25.532 LEFT WRIST PAIN: Primary | ICD-10-CM

## 2025-02-14 DIAGNOSIS — M25.532 LEFT WRIST PAIN: ICD-10-CM

## 2025-02-14 PROCEDURE — 3077F SYST BP >= 140 MM HG: CPT | Performed by: PHYSICIAN ASSISTANT

## 2025-02-14 PROCEDURE — 99213 OFFICE O/P EST LOW 20 MIN: CPT | Performed by: PHYSICIAN ASSISTANT

## 2025-02-14 PROCEDURE — 3008F BODY MASS INDEX DOCD: CPT | Performed by: PHYSICIAN ASSISTANT

## 2025-02-14 PROCEDURE — 73110 X-RAY EXAM OF WRIST: CPT | Performed by: PHYSICIAN ASSISTANT

## 2025-02-14 PROCEDURE — 3079F DIAST BP 80-89 MM HG: CPT | Performed by: PHYSICIAN ASSISTANT

## 2025-02-14 NOTE — PROGRESS NOTES
Subjective:   Patient ID: Sudhakar Preciado is a 40 year old male.    HPI  Patient presents c/o left wrist pain:  Right handed  He uses both hands at work and lifting weight is painful  Also radiating to forearm  Ongoing 2 weeks  No trauma or injury  Mild swelling   Pain worse with movement  Also hears cracking with movement  No numbness or tingling  Getting weaker over time    Has not tried any otc analgesics yet  Pain level is manageable    No h/o gout  Has never happened before      History/Other:   Review of Systems   Constitutional:  Negative for chills, diaphoresis and fever.   Musculoskeletal:  Positive for arthralgias (left wrist), joint swelling (left wrist) and myalgias.   Neurological:  Negative for weakness and numbness.     Current Outpatient Medications   Medication Sig Dispense Refill    Methylphenidate HCl ER 20 MG Oral Tab CR Take 1 tablet (20 mg total) by mouth every morning. 30 tablet 0    Multiple Vitamins-Minerals (MULTIVITAMIN ADULT OR) Take by mouth daily.       Allergies:Allergies[1]    Objective:   Physical Exam  Vitals and nursing note reviewed.   Constitutional:       Appearance: Normal appearance.   Musculoskeletal:      Right wrist: Normal.      Left wrist: Swelling, tenderness, bony tenderness and crepitus present. Decreased range of motion. Normal pulse.   Neurological:      Mental Status: He is alert.         Assessment & Plan:   1. Left wrist pain  Etiology unclear, no trauma or inciting event. Given pain, swelling, and crepitus, will check xray to evaluate further. Follow up pending results. Recommend wrist splint be worn nightly x 2-4 weeks. RICE and NSAIDs recommended. If not soon better refer for PT/OT and/or ortho consult.   - XR WRIST COMPLETE (MIN 3 VIEWS), LEFT (CPT=73110); Future             [1] No Known Allergies

## 2025-03-05 DIAGNOSIS — F90.8 OTHER SPECIFIED ATTENTION DEFICIT HYPERACTIVITY DISORDER (ADHD): ICD-10-CM

## 2025-03-06 NOTE — TELEPHONE ENCOUNTER
Patient comment: Is there a way to do like i was doing before and have a 3 separate month prescriptions already ready so i don't have to keep asking for a new one every month?     A refill request was received for:  Requested Prescriptions     Pending Prescriptions Disp Refills    Methylphenidate HCl ER 20 MG Oral Tab CR 30 tablet 0     Sig: Take 1 tablet (20 mg total) by mouth every morning.       Last refill date:   1/31/2025    Last office visit: 9/17/2024    Follow up due:  Future Appointments   Date Time Provider Department Center   3/18/2025  3:30 PM Fabienne Rajan DO [facility]

## 2025-03-07 RX ORDER — METHYLPHENIDATE HYDROCHLORIDE EXTENDED RELEASE 20 MG/1
20 TABLET ORAL EVERY MORNING
Qty: 30 TABLET | Refills: 0 | Status: SHIPPED | OUTPATIENT
Start: 2025-03-07

## (undated) NOTE — LETTER
Date: 1/18/2021    Patient Name: Ny South          To Whom it may concern: This letter has been written at the patient's request. The above patient was seen at the [facility] for treatment of a medical condition.     Estephania harris retu

## (undated) NOTE — LETTER
07/30/21        [place] [id number]      Dear Shiva Weaver,    [facility] records indicate that you have outstanding lab work and or testing that was ordered for you and has not yet been completed:  Orders Placed This Encounter

## (undated) NOTE — LETTER
02/06/19        [facility]  [address]      Dear Samantha  records indicate that you have outstanding lab work and or testing that was ordered for you and has not yet been completed:  Orders Placed This Encounter      T4

## (undated) NOTE — LETTER
Date & Time: 1/14/2021, 10:12 PM  Patient: Bashir Rosa  Encounter Provider(s):    Sabiha Vaz MD       To Whom It May Concern:    Karina Lux was seen and treated in our department on 1/14/2021.  He can return to work with these limitations: gonsalez

## (undated) NOTE — LETTER
05/18/20  May 18, 2020        [address]ine [id number]      Dear Silvino Polanco: Our records indicate that you have outstanding Lab work that was ordered for you on 3/17/2020 and has not yet been completed.    To provide you with the be

## (undated) NOTE — LETTER
Date: 4/9/2018    Patient Name: Eulalia Colbert          To Whom it may concern: This letter has been written at the patient's request. The above patient was seen at the [facility] for treatment of a medical condition.     This patient sh